# Patient Record
Sex: FEMALE | Race: WHITE | Employment: FULL TIME | URBAN - NONMETROPOLITAN AREA
[De-identification: names, ages, dates, MRNs, and addresses within clinical notes are randomized per-mention and may not be internally consistent; named-entity substitution may affect disease eponyms.]

---

## 2017-11-22 ENCOUNTER — HOSPITAL ENCOUNTER (EMERGENCY)
Age: 32
Discharge: HOME | End: 2017-11-22
Payer: COMMERCIAL

## 2017-11-22 VITALS
HEART RATE: 124 BPM | DIASTOLIC BLOOD PRESSURE: 68 MMHG | WEIGHT: 200 LBS | TEMPERATURE: 98.1 F | OXYGEN SATURATION: 95 % | RESPIRATION RATE: 18 BRPM | BODY MASS INDEX: 28.63 KG/M2 | HEIGHT: 70 IN | SYSTOLIC BLOOD PRESSURE: 171 MMHG

## 2017-11-22 DIAGNOSIS — J06.9 VIRAL URI WITH COUGH: Primary | ICD-10-CM

## 2017-11-22 DIAGNOSIS — H65.01 RIGHT ACUTE SEROUS OTITIS MEDIA, RECURRENCE NOT SPECIFIED: ICD-10-CM

## 2017-11-22 PROCEDURE — 99202 OFFICE O/P NEW SF 15 MIN: CPT | Performed by: NURSE PRACTITIONER

## 2017-11-22 PROCEDURE — 94640 AIRWAY INHALATION TREATMENT: CPT

## 2017-11-22 PROCEDURE — 99212 OFFICE O/P EST SF 10 MIN: CPT

## 2017-11-22 PROCEDURE — 6370000000 HC RX 637 (ALT 250 FOR IP): Performed by: NURSE PRACTITIONER

## 2017-11-22 RX ORDER — BROMPHENIRAMINE MALEATE, PSEUDOEPHEDRINE HYDROCHLORIDE, AND DEXTROMETHORPHAN HYDROBROMIDE 2; 30; 10 MG/5ML; MG/5ML; MG/5ML
5 SYRUP ORAL 4 TIMES DAILY PRN
Qty: 80 ML | Refills: 0 | Status: SHIPPED | OUTPATIENT
Start: 2017-11-22 | End: 2017-11-27

## 2017-11-22 RX ORDER — BROMPHENIRAMINE MALEATE, PSEUDOEPHEDRINE HYDROCHLORIDE, AND DEXTROMETHORPHAN HYDROBROMIDE 2; 30; 10 MG/5ML; MG/5ML; MG/5ML
1.25 SYRUP ORAL 4 TIMES DAILY PRN
Qty: 80 ML | Refills: 0 | Status: SHIPPED | OUTPATIENT
Start: 2017-11-22 | End: 2017-11-22

## 2017-11-22 RX ORDER — IPRATROPIUM BROMIDE AND ALBUTEROL SULFATE 2.5; .5 MG/3ML; MG/3ML
1 SOLUTION RESPIRATORY (INHALATION) ONCE
Status: COMPLETED | OUTPATIENT
Start: 2017-11-22 | End: 2017-11-22

## 2017-11-22 RX ORDER — ALBUTEROL SULFATE 90 UG/1
2 AEROSOL, METERED RESPIRATORY (INHALATION) 2 TIMES DAILY
Qty: 1 INHALER | Refills: 0 | Status: SHIPPED | OUTPATIENT
Start: 2017-11-22 | End: 2017-11-27

## 2017-11-22 RX ORDER — AMOXICILLIN 500 MG/1
500 CAPSULE ORAL 3 TIMES DAILY
Qty: 30 CAPSULE | Refills: 0 | Status: SHIPPED | OUTPATIENT
Start: 2017-11-22 | End: 2017-12-02

## 2017-11-22 RX ORDER — M-VIT,TX,IRON,MINS/CALC/FOLIC 27MG-0.4MG
1 TABLET ORAL DAILY
COMMUNITY

## 2017-11-22 RX ORDER — METHYLPREDNISOLONE 4 MG/1
TABLET ORAL
Qty: 1 KIT | Refills: 0 | Status: SHIPPED | OUTPATIENT
Start: 2017-11-22 | End: 2017-11-28

## 2017-11-22 RX ADMIN — IPRATROPIUM BROMIDE AND ALBUTEROL SULFATE 1 AMPULE: .5; 3 SOLUTION RESPIRATORY (INHALATION) at 11:32

## 2017-11-22 ASSESSMENT — ENCOUNTER SYMPTOMS
NAUSEA: 1
APNEA: 0
CONSTIPATION: 0
VOMITING: 0
SORE THROAT: 0
WHEEZING: 0
DIARRHEA: 0
CHEST TIGHTNESS: 0
SHORTNESS OF BREATH: 0
COUGH: 1
STRIDOR: 0
RHINORRHEA: 0
SINUS CONGESTION: 0
EYE DISCHARGE: 0
CHOKING: 0

## 2017-11-22 NOTE — ED NOTES
Pt discharge teaching taught via teach back method. No concerns voiced.      Aspen Shaver RN  11/22/17 9238

## 2017-11-22 NOTE — ED PROVIDER NOTES
headaches. PAST MEDICAL HISTORY   History reviewed. No pertinent past medical history. SURGICAL HISTORY     Patient  has a past surgical history that includes  section; [place] tooth extraction; and Tonsillectomy. CURRENT MEDICATIONS       Previous Medications    MULTIPLE VITAMINS-MINERALS (THERAPEUTIC MULTIVITAMIN-MINERALS) TABLET    Take 1 tablet by mouth daily       ALLERGIES     Patient is has No Known Allergies. FAMILY HISTORY     Patient's family history is not on file. SOCIAL HISTORY     Patient  reports that she has never smoked. She has never used smokeless tobacco. She reports that she drinks alcohol. She reports that she does not use drugs. PHYSICAL EXAM     ED TRIAGE VITALS  BP: (!) 144/80, Temp: 98 °F (36.7 °C), Pulse: 103, Resp: 18, SpO2: 95 %  Physical Exam   Constitutional: She appears well-developed and well-nourished. HENT:   Head: Normocephalic and atraumatic. Right Ear: A middle ear effusion is present. Left Ear: Hearing, tympanic membrane, external ear and ear canal normal.   Nose: Mucosal edema and rhinorrhea present. Mouth/Throat: Uvula is midline and mucous membranes are normal. Posterior oropharyngeal edema and posterior oropharyngeal erythema present. Cardiovascular: Normal rate, regular rhythm, S1 normal, S2 normal and normal heart sounds. Pulmonary/Chest: Effort normal. She has no decreased breath sounds. She has wheezes in the right upper field, the right middle field and the right lower field. She has no rhonchi. She has no rales. Post-aerosol DuoNeb treatment lung sounds improved minimally patient does state some mild improvement. Encouraged to  medications, start the Medrol Dosepak and use the albuterol inhaler twice a day for 5 days, with as needed use, every 6 hours, as well. Nursing note and vitals reviewed. DIAGNOSTIC RESULTS   Labs:No results found for this visit on 17.     IMAGING:  No orders to display     URGENT Congestion or Cough    METHYLPREDNISOLONE (MEDROL, GRACIELA,) 4 MG TABLET    Take by mouth.      Current Discharge Medication List          LUIZ Peterson NP  11/22/17 4355

## 2017-12-12 LAB
ABO + RH BLD: NORMAL
D AG BLD QL: NORMAL
HIV 1+2 AB+HIV1 P24 AG SERPL QL IA: NONREACTIVE
RPR SER QL: NEGATIVE

## 2018-03-06 ENCOUNTER — OFFICE VISIT (OUTPATIENT)
Dept: OBSTETRICS AND GYNECOLOGY | Facility: CLINIC | Age: 33
End: 2018-03-06
Attending: NURSE PRACTITIONER
Payer: COMMERCIAL

## 2018-03-06 VITALS — WEIGHT: 234.6 LBS | DIASTOLIC BLOOD PRESSURE: 70 MMHG | SYSTOLIC BLOOD PRESSURE: 120 MMHG

## 2018-03-06 DIAGNOSIS — Z3A.16 16 WEEKS GESTATION OF PREGNANCY: Primary | ICD-10-CM

## 2018-03-06 PROCEDURE — 0502F SUBSEQUENT PRENATAL CARE: CPT | Performed by: NURSE PRACTITIONER

## 2018-03-06 NOTE — PROGRESS NOTES
Seen in ob chart.    Routine Prenatal Visit 3/6/2018  Gestation: 15w6d    The following have been marked reviewed and updated as appropriate in this visit:       Patient blood pressure: BP: 120/70  Patient Weight: Weight: 106 kg (234 lb 9.6 oz)              Signs/symptoms of labor present: ***                Point of Care resulted today:   ,    Noted prenatal risks/complications: {Prenatal risks/complications:74180}  Problem List Items Addressed This Visit     None      Visit Diagnoses     16 weeks gestation of pregnancy    -  Primary    Relevant Orders    US OB Detail Fetal Anatomy Single        No Follow-up on file.  JESSICA Adan

## 2018-03-09 ENCOUNTER — TRANSCRIBE ORDERS (OUTPATIENT)
Dept: SCHEDULING | Age: 33
End: 2018-03-09

## 2018-03-09 DIAGNOSIS — O35.10X0 MATERNAL CARE FOR SUSPECTED CHROMOSOMAL ABNORMALITY IN FETUS, NOT APPLICABLE OR UNSPECIFIED FETUS: Primary | ICD-10-CM

## 2018-04-04 ENCOUNTER — HOSPITAL ENCOUNTER (OUTPATIENT)
Dept: PERINATAL CARE | Facility: HOSPITAL | Age: 33
Discharge: HOME | End: 2018-04-04
Attending: NURSE PRACTITIONER
Payer: COMMERCIAL

## 2018-04-04 DIAGNOSIS — O35.9XX0 SUSPECTED FETAL ANOMALY, ANTEPARTUM, SINGLE OR UNSPECIFIED FETUS: ICD-10-CM

## 2018-04-04 DIAGNOSIS — Z3A.20 20 WEEKS GESTATION OF PREGNANCY: ICD-10-CM

## 2018-04-04 DIAGNOSIS — O46.92 SECOND TRIMESTER BLEEDING: ICD-10-CM

## 2018-04-04 DIAGNOSIS — Z36.86 ENCOUNTER FOR ANTENATAL SCREENING FOR CERVICAL LENGTH: ICD-10-CM

## 2018-04-04 PROCEDURE — 76817 TRANSVAGINAL US OBSTETRIC: CPT

## 2018-04-04 PROCEDURE — 76811 OB US DETAILED SNGL FETUS: CPT

## 2018-04-06 ENCOUNTER — OFFICE VISIT (OUTPATIENT)
Dept: OBSTETRICS AND GYNECOLOGY | Facility: CLINIC | Age: 33
End: 2018-04-06
Payer: COMMERCIAL

## 2018-04-06 VITALS — WEIGHT: 238.2 LBS | SYSTOLIC BLOOD PRESSURE: 122 MMHG | DIASTOLIC BLOOD PRESSURE: 70 MMHG

## 2018-04-06 DIAGNOSIS — Z3A.20 20 WEEKS GESTATION OF PREGNANCY: Primary | ICD-10-CM

## 2018-04-06 PROCEDURE — 0502F SUBSEQUENT PRENATAL CARE: CPT | Performed by: NURSE PRACTITIONER

## 2018-05-04 ENCOUNTER — OFFICE VISIT (OUTPATIENT)
Dept: OBSTETRICS AND GYNECOLOGY | Facility: CLINIC | Age: 33
End: 2018-05-04
Payer: COMMERCIAL

## 2018-05-04 VITALS — DIASTOLIC BLOOD PRESSURE: 60 MMHG | SYSTOLIC BLOOD PRESSURE: 100 MMHG | WEIGHT: 242.6 LBS

## 2018-05-04 DIAGNOSIS — Z3A.24 24 WEEKS GESTATION OF PREGNANCY: Primary | ICD-10-CM

## 2018-05-04 LAB
GLUCOSE URINE, POC: NEGATIVE
PROTEIN, POC: NEGATIVE

## 2018-05-04 PROCEDURE — 0502F SUBSEQUENT PRENATAL CARE: CPT | Performed by: OBSTETRICS & GYNECOLOGY

## 2018-05-04 PROCEDURE — 81002 URINALYSIS NONAUTO W/O SCOPE: CPT | Performed by: OBSTETRICS & GYNECOLOGY

## 2018-05-25 ENCOUNTER — APPOINTMENT (OUTPATIENT)
Dept: LAB | Age: 33
End: 2018-05-25
Attending: OBSTETRICS & GYNECOLOGY
Payer: COMMERCIAL

## 2018-05-25 ENCOUNTER — TRANSCRIBE ORDERS (OUTPATIENT)
Dept: LAB | Age: 33
End: 2018-05-25

## 2018-05-25 DIAGNOSIS — Z3A.24 24 WEEKS GESTATION OF PREGNANCY: ICD-10-CM

## 2018-05-25 LAB
ERYTHROCYTE [DISTWIDTH] IN BLOOD BY AUTOMATED COUNT: 12.5 % (ref 11.7–14.4)
GLUCOSE 1H P GLC SERPL-MCNC: 117 MG/DL (ref 51–180)
HCT VFR BLDCO AUTO: 30.4 % (ref 35–45)
HGB BLD-MCNC: 10 G/DL (ref 11.8–15.7)
MCH RBC QN AUTO: 29.7 PG (ref 28–33.2)
MCHC RBC AUTO-ENTMCNC: 32.9 G/DL (ref 32.2–35.5)
MCV RBC AUTO: 90.2 FL (ref 83–98)
PDW BLD AUTO: 10 FL (ref 9.4–12.3)
PLATELET # BLD AUTO: 201 K/UL (ref 150–369)
RBC # BLD AUTO: 3.37 M/UL (ref 3.93–5.22)
WBC # BLD AUTO: 10.5 K/UL (ref 3.8–10.5)

## 2018-05-25 PROCEDURE — 82950 GLUCOSE TEST: CPT

## 2018-05-25 PROCEDURE — 85027 COMPLETE CBC AUTOMATED: CPT | Performed by: OBSTETRICS & GYNECOLOGY

## 2018-05-25 PROCEDURE — 36415 COLL VENOUS BLD VENIPUNCTURE: CPT | Performed by: OBSTETRICS & GYNECOLOGY

## 2018-05-31 ENCOUNTER — OFFICE VISIT (OUTPATIENT)
Dept: OBSTETRICS AND GYNECOLOGY | Facility: CLINIC | Age: 33
End: 2018-05-31
Payer: COMMERCIAL

## 2018-05-31 VITALS — DIASTOLIC BLOOD PRESSURE: 70 MMHG | SYSTOLIC BLOOD PRESSURE: 120 MMHG | WEIGHT: 254 LBS

## 2018-05-31 DIAGNOSIS — Z3A.28 28 WEEKS GESTATION OF PREGNANCY: Primary | ICD-10-CM

## 2018-05-31 LAB
GLUCOSE URINE, POC: NEGATIVE
PROTEIN, POC: NORMAL

## 2018-05-31 PROCEDURE — 81002 URINALYSIS NONAUTO W/O SCOPE: CPT | Performed by: OBSTETRICS & GYNECOLOGY

## 2018-05-31 PROCEDURE — 0502F SUBSEQUENT PRENATAL CARE: CPT | Performed by: OBSTETRICS & GYNECOLOGY

## 2018-06-19 ENCOUNTER — OFFICE VISIT (OUTPATIENT)
Dept: OBSTETRICS AND GYNECOLOGY | Facility: CLINIC | Age: 33
End: 2018-06-19
Payer: COMMERCIAL

## 2018-06-19 ENCOUNTER — TRANSCRIBE ORDERS (OUTPATIENT)
Dept: SCHEDULING | Age: 33
End: 2018-06-19

## 2018-06-19 VITALS — WEIGHT: 257.2 LBS | SYSTOLIC BLOOD PRESSURE: 110 MMHG | DIASTOLIC BLOOD PRESSURE: 62 MMHG

## 2018-06-19 DIAGNOSIS — O36.63X0 EXCESSIVE FETAL GROWTH AFFECTING MANAGEMENT OF PREGNANCY IN THIRD TRIMESTER, SINGLE OR UNSPECIFIED FETUS: ICD-10-CM

## 2018-06-19 DIAGNOSIS — O36.63X0 MATERNAL CARE FOR EXCESSIVE FETAL GROWTH IN THIRD TRIMESTER: Primary | ICD-10-CM

## 2018-06-19 DIAGNOSIS — Z3A.30 30 WEEKS GESTATION OF PREGNANCY: Primary | ICD-10-CM

## 2018-06-19 LAB
GLUCOSE URINE, POC: NEGATIVE
PROTEIN, POC: NEGATIVE

## 2018-06-19 PROCEDURE — 81002 URINALYSIS NONAUTO W/O SCOPE: CPT | Performed by: OBSTETRICS & GYNECOLOGY

## 2018-06-19 PROCEDURE — 0502F SUBSEQUENT PRENATAL CARE: CPT | Performed by: OBSTETRICS & GYNECOLOGY

## 2018-06-19 RX ORDER — OMEPRAZOLE 20 MG/1
20 TABLET, DELAYED RELEASE ORAL
Qty: 90 TABLET | Refills: 1
Start: 2018-06-19 | End: 2018-10-31

## 2018-06-25 ENCOUNTER — HOSPITAL ENCOUNTER (OUTPATIENT)
Dept: PERINATAL CARE | Facility: HOSPITAL | Age: 33
Discharge: HOME | End: 2018-06-25
Attending: OBSTETRICS & GYNECOLOGY
Payer: COMMERCIAL

## 2018-06-25 DIAGNOSIS — Z98.891 PREVIOUS CESAREAN SECTION: Primary | ICD-10-CM

## 2018-06-25 DIAGNOSIS — Z3A.31 31 WEEKS GESTATION OF PREGNANCY: ICD-10-CM

## 2018-06-25 DIAGNOSIS — O26.843 UTERINE SIZE DATE DISCREPANCY PREGNANCY, THIRD TRIMESTER: ICD-10-CM

## 2018-06-25 PROCEDURE — 76805 OB US >/= 14 WKS SNGL FETUS: CPT

## 2018-07-03 ENCOUNTER — OFFICE VISIT (OUTPATIENT)
Dept: OBSTETRICS AND GYNECOLOGY | Facility: CLINIC | Age: 33
End: 2018-07-03
Payer: COMMERCIAL

## 2018-07-03 VITALS — DIASTOLIC BLOOD PRESSURE: 76 MMHG | SYSTOLIC BLOOD PRESSURE: 138 MMHG | WEIGHT: 259 LBS

## 2018-07-03 DIAGNOSIS — Z3A.32 32 WEEKS GESTATION OF PREGNANCY: Primary | ICD-10-CM

## 2018-07-03 LAB
GLUCOSE URINE, POC: NEGATIVE
PROTEIN, POC: NORMAL

## 2018-07-17 ENCOUNTER — OFFICE VISIT (OUTPATIENT)
Dept: OBSTETRICS AND GYNECOLOGY | Facility: CLINIC | Age: 33
End: 2018-07-17
Payer: COMMERCIAL

## 2018-07-17 VITALS — WEIGHT: 265.6 LBS | SYSTOLIC BLOOD PRESSURE: 130 MMHG | DIASTOLIC BLOOD PRESSURE: 80 MMHG

## 2018-07-17 DIAGNOSIS — Z3A.35 35 WEEKS GESTATION OF PREGNANCY: Primary | ICD-10-CM

## 2018-07-17 LAB
GLUCOSE URINE, POC: NEGATIVE
PROTEIN, POC: NORMAL

## 2018-07-17 PROCEDURE — 81002 URINALYSIS NONAUTO W/O SCOPE: CPT | Performed by: OBSTETRICS & GYNECOLOGY

## 2018-07-17 PROCEDURE — 0502F SUBSEQUENT PRENATAL CARE: CPT | Performed by: OBSTETRICS & GYNECOLOGY

## 2018-07-24 ENCOUNTER — OFFICE VISIT (OUTPATIENT)
Dept: OBSTETRICS AND GYNECOLOGY | Facility: CLINIC | Age: 33
End: 2018-07-24
Payer: COMMERCIAL

## 2018-07-24 VITALS — SYSTOLIC BLOOD PRESSURE: 132 MMHG | WEIGHT: 266.8 LBS | DIASTOLIC BLOOD PRESSURE: 80 MMHG

## 2018-07-24 DIAGNOSIS — O99.013 ANEMIA AFFECTING PREGNANCY IN THIRD TRIMESTER: ICD-10-CM

## 2018-07-24 DIAGNOSIS — Z3A.35 35 WEEKS GESTATION OF PREGNANCY: Primary | ICD-10-CM

## 2018-07-24 LAB
GLUCOSE URINE, POC: NEGATIVE
PROTEIN, POC: NORMAL

## 2018-07-24 PROCEDURE — 87150 DNA/RNA AMPLIFIED PROBE: CPT | Performed by: OBSTETRICS & GYNECOLOGY

## 2018-07-24 PROCEDURE — 0502F SUBSEQUENT PRENATAL CARE: CPT | Performed by: OBSTETRICS & GYNECOLOGY

## 2018-07-24 PROCEDURE — 87081 CULTURE SCREEN ONLY: CPT | Performed by: OBSTETRICS & GYNECOLOGY

## 2018-07-24 RX ORDER — TETANUS TOXOID, REDUCED DIPHTHERIA TOXOID AND ACELLULAR PERTUSSIS VACCINE, ADSORBED 5; 2.5; 8; 8; 2.5 [IU]/.5ML; [IU]/.5ML; UG/.5ML; UG/.5ML; UG/.5ML
SUSPENSION INTRAMUSCULAR
Refills: 0 | COMMUNITY
Start: 2018-07-16 | End: 2018-08-19 | Stop reason: HOSPADM

## 2018-07-25 LAB
GP B STREP DNA SPEC QL NAA+PROBE: NEGATIVE
GP B STREP SPEC QL CULT: NORMAL

## 2018-07-30 ENCOUNTER — APPOINTMENT (OUTPATIENT)
Dept: LAB | Age: 33
End: 2018-07-30
Attending: OBSTETRICS & GYNECOLOGY
Payer: COMMERCIAL

## 2018-07-30 DIAGNOSIS — O99.013 ANEMIA AFFECTING PREGNANCY IN THIRD TRIMESTER: ICD-10-CM

## 2018-07-30 LAB
ERYTHROCYTE [DISTWIDTH] IN BLOOD BY AUTOMATED COUNT: 15.1 % (ref 11.7–14.4)
HCT VFR BLDCO AUTO: 29 % (ref 35–45)
HGB BLD-MCNC: 9 G/DL (ref 11.8–15.7)
MCH RBC QN AUTO: 26.7 PG (ref 28–33.2)
MCHC RBC AUTO-ENTMCNC: 31 G/DL (ref 32.2–35.5)
MCV RBC AUTO: 86.1 FL (ref 83–98)
PDW BLD AUTO: 10 FL (ref 9.4–12.3)
PLATELET # BLD AUTO: 182 K/UL (ref 150–369)
RBC # BLD AUTO: 3.37 M/UL (ref 3.93–5.22)
WBC # BLD AUTO: 10.7 K/UL (ref 3.8–10.5)

## 2018-07-30 PROCEDURE — 85027 COMPLETE CBC AUTOMATED: CPT

## 2018-07-30 PROCEDURE — 36415 COLL VENOUS BLD VENIPUNCTURE: CPT

## 2018-07-31 ENCOUNTER — OFFICE VISIT (OUTPATIENT)
Dept: OBSTETRICS AND GYNECOLOGY | Facility: CLINIC | Age: 33
End: 2018-07-31
Payer: COMMERCIAL

## 2018-07-31 VITALS — SYSTOLIC BLOOD PRESSURE: 120 MMHG | WEIGHT: 272.4 LBS | DIASTOLIC BLOOD PRESSURE: 60 MMHG

## 2018-07-31 DIAGNOSIS — Z3A.36 36 WEEKS GESTATION OF PREGNANCY: Primary | ICD-10-CM

## 2018-07-31 DIAGNOSIS — O99.013 ANEMIA DURING PREGNANCY IN THIRD TRIMESTER: ICD-10-CM

## 2018-07-31 LAB
GLUCOSE URINE, POC: NEGATIVE
PROTEIN, POC: NORMAL

## 2018-07-31 PROCEDURE — 0502F SUBSEQUENT PRENATAL CARE: CPT | Performed by: OBSTETRICS & GYNECOLOGY

## 2018-07-31 PROCEDURE — 81002 URINALYSIS NONAUTO W/O SCOPE: CPT | Performed by: OBSTETRICS & GYNECOLOGY

## 2018-08-03 ENCOUNTER — LAB REQUISITION (OUTPATIENT)
Dept: LAB | Facility: HOSPITAL | Age: 33
End: 2018-08-03
Attending: INTERNAL MEDICINE
Payer: COMMERCIAL

## 2018-08-03 DIAGNOSIS — D50.9 IRON DEFICIENCY ANEMIA: ICD-10-CM

## 2018-08-03 LAB
BASOPHILS # BLD: 0.02 K/UL (ref 0.01–0.1)
BASOPHILS NFR BLD: 0.2 %
DIFFERENTIAL METHOD BLD: ABNORMAL
EOSINOPHIL # BLD: 0.07 K/UL (ref 0.04–0.36)
EOSINOPHIL NFR BLD: 0.8 %
ERYTHROCYTE [DISTWIDTH] IN BLOOD BY AUTOMATED COUNT: 15.1 % (ref 11.7–14.4)
FERRITIN SERPL-MCNC: 10 NG/ML (ref 11–250)
HCT VFR BLDCO AUTO: 27.5 % (ref 35–45)
HGB BLD-MCNC: 8.8 G/DL (ref 11.8–15.7)
IMM GRANULOCYTES # BLD AUTO: 0.11 K/UL (ref 0–0.08)
IMM GRANULOCYTES NFR BLD AUTO: 1.3 %
IRON SERPL-MCNC: 27 UG/DL (ref 35–150)
LYMPHOCYTES # BLD: 1.52 K/UL (ref 1.2–3.5)
LYMPHOCYTES NFR BLD: 17.3 %
MCH RBC QN AUTO: 26.3 PG (ref 28–33.2)
MCHC RBC AUTO-ENTMCNC: 32 G/DL (ref 32.2–35.5)
MCV RBC AUTO: 82.1 FL (ref 83–98)
MONOCYTES # BLD: 0.63 K/UL (ref 0.28–0.8)
MONOCYTES NFR BLD: 7.2 %
NEUTROPHILS # BLD: 6.43 K/UL (ref 1.7–7)
NEUTS SEG NFR BLD: 73.2 %
NRBC BLD-RTO: 0 %
PDW BLD AUTO: 10.3 FL (ref 9.4–12.3)
PLATELET # BLD AUTO: 183 K/UL (ref 150–369)
RBC # BLD AUTO: 3.35 M/UL (ref 3.93–5.22)
WBC # BLD AUTO: 8.78 K/UL (ref 3.8–10.5)

## 2018-08-03 PROCEDURE — 83540 ASSAY OF IRON: CPT | Performed by: INTERNAL MEDICINE

## 2018-08-03 PROCEDURE — 82728 ASSAY OF FERRITIN: CPT | Performed by: INTERNAL MEDICINE

## 2018-08-03 PROCEDURE — 85025 COMPLETE CBC W/AUTO DIFF WBC: CPT | Performed by: INTERNAL MEDICINE

## 2018-08-07 ENCOUNTER — OFFICE VISIT (OUTPATIENT)
Dept: OBSTETRICS AND GYNECOLOGY | Facility: CLINIC | Age: 33
End: 2018-08-07
Payer: COMMERCIAL

## 2018-08-07 VITALS — SYSTOLIC BLOOD PRESSURE: 130 MMHG | DIASTOLIC BLOOD PRESSURE: 66 MMHG | WEIGHT: 274.2 LBS

## 2018-08-07 DIAGNOSIS — Z3A.37 37 WEEKS GESTATION OF PREGNANCY: Primary | ICD-10-CM

## 2018-08-07 LAB
GLUCOSE URINE, POC: NEGATIVE
PROTEIN, POC: NORMAL

## 2018-08-07 PROCEDURE — 81002 URINALYSIS NONAUTO W/O SCOPE: CPT | Performed by: OBSTETRICS & GYNECOLOGY

## 2018-08-07 PROCEDURE — 0502F SUBSEQUENT PRENATAL CARE: CPT | Performed by: OBSTETRICS & GYNECOLOGY

## 2018-08-07 RX ORDER — SODIUM CHLORIDE, SODIUM LACTATE, POTASSIUM CHLORIDE, CALCIUM CHLORIDE 600; 310; 30; 20 MG/100ML; MG/100ML; MG/100ML; MG/100ML
150 INJECTION, SOLUTION INTRAVENOUS CONTINUOUS
Status: CANCELLED | OUTPATIENT
Start: 2018-08-07 | End: 2018-08-14

## 2018-08-07 RX ORDER — ACETAMINOPHEN 650 MG/1
650 SUPPOSITORY RECTAL ONCE
Status: CANCELLED | OUTPATIENT
Start: 2018-08-07 | End: 2018-08-08

## 2018-08-07 RX ORDER — ACETAMINOPHEN 160 MG/5ML
1000 LIQUID ORAL ONCE
Status: CANCELLED | OUTPATIENT
Start: 2018-08-07 | End: 2018-08-08

## 2018-08-07 RX ORDER — ACETAMINOPHEN 325 MG/1
975 TABLET ORAL ONCE
Status: CANCELLED | OUTPATIENT
Start: 2018-08-07 | End: 2018-08-07

## 2018-08-07 RX ORDER — ONDANSETRON 4 MG/1
8 TABLET, ORALLY DISINTEGRATING ORAL ONCE
Status: CANCELLED | OUTPATIENT
Start: 2018-08-07 | End: 2018-08-07

## 2018-08-07 NOTE — H&P
HPI     Daya Pacheco is a 33 y.o. female  at 37w6d with an estimated due date of 2018, by Last Menstrual Period who presents for scheduled repeat  section  She denies ctx, VB or LOF. Baby moving well.     Pregnancy complicated by Anemia. Hgb 8.8 on 8/3/18. Getting IV iron via Hematology.     Last PO intake:   ,     ,      OB History:   Obstetric History       T1      L1     SAB4   TAB0   Ectopic0   Multiple0   Live Births1       # Outcome Date GA Lbr Miky/2nd Weight Sex Delivery Anes PTL Lv   6 Current            5 2017     SAB      4 SAB 2017     SAB      3 Term 11/15/15 39w0d  5.33 kg F CS-LTranv Spinal  IVON   2 2014 7w0d    SAB      1 2011             Medical History:   Past Medical History:   Diagnosis Date   • Anemia in pregnancy        Surgical History:   Past Surgical History:   Procedure Laterality Date   •  SECTION, LOW TRANSVERSE     • D&C FIRST TRIMESTER / TX INCOMPLETE / MISSED / SEPTIC / INDUCED      • D&C FIRST TRIMESTER / TX INCOMPLETE / MISSED / SEPTIC / INDUCED      • D&C FIRST TRIMESTER / TX INCOMPLETE / MISSED / SEPTIC / INDUCED      • TONSILLECTOMY     • WISDOM TOOTH EXTRACTION         Social History:   Social History     Social History   • Marital status:      Spouse name: Rowdy Pacheco   • Number of children: N/A   • Years of education: N/A     Occupational History   • Mental health therapist      Social History Main Topics   • Smoking status: Never Smoker   • Smokeless tobacco: Never Used   • Alcohol use No   • Drug use: No   • Sexual activity: Yes     Partners: Male     Other Topics Concern   • None     Social History Narrative   • None        Family History: History reviewed. No pertinent family history.    Allergies: No known allergies    Prior to Admission medications    Medication Sig Start Date End Date Taking? Authorizing Provider   BOOSTRIX TDAP 2.5-8-5 Lf-mcg-Lf/0.5mL  injection TO BE ADMINISTERED BY PHARMACIST FOR IMMUNIZATION 18   Katerina Bravo MD   omeprazole OTC (PriLOSEC OTC) 20 mg EC tablet Take 1 tablet (20 mg total) by mouth daily before breakfast. 18  Cammie Gutiérrez MD   PRENATAL VITS96/IRON FUM/FOLIC (PRE- VITAMIN) 27 mg iron- 800 mcg tablet Take 1 tablet by mouth daily.    Katerina Bravo MD   ranitidine (ZANTAC) 15 mg/mL syrup Take by mouth 2 (two) times a day.    Katerina Bravo MD       Review of Systems  Pertinent items are noted in HPI.    Objective     Vital Signs for the last 24 hours:  BP: (130)/(66) 130/66    Latest cervical exam:  18- closed/long    Exam:  General Appearance: Alert, cooperative, no acute distress  Lungs: Clear to auscultation bilaterally, respirations unlabored  Heart: Regular rate and rhythm, S1 and S2 normal, no murmur, rub or gallop  Abdomen: gravid, nontender  Genitalia: See vaginal exam  Extremities: no edema or calf tenderness  Neurologic: grossly intact without focal deficits    Ultrasounds:   I have reviewed the applicable Ultrasounds. and Significant findings include:   (18 at 31 wks)  EFW 2630g 1mr16ui (>90%)       Labs:  Rubella IgG Quant   Date Value Ref Range Status   2018 48.2 IU/ML Final     Comment:     REFERENCE RANGE:  <10 IU/mL: Non-Reactive. Considered to have absence of immunity.  10.0-14.9 IU/mL Equivocal. Follow up sample or testing with an alternate method suggested.  >= 15 IU/mL:    Reactive. Indicates acute or past infection or vaccination.       Strep Gp B Cult/DNA Probe   Date Value Ref Range Status   2018 No growth at 18-24 hours  Final     Group B Streptococcus, PCR   Date Value Ref Range Status   2018 Negative Negative Final     Comment:       No Group B Streptococcus detected by PCR       Assessment/Plan     Daya Pacheco is a 33 y.o. female  at 37w6d admitted for repeat . Declines TOLAC    - admit to L&D  - NPO/IVF  -  Ancef per protocol  - NST on admission  - scan for fetal position      Cammie Gutiérrez MD

## 2018-08-13 ENCOUNTER — OFFICE VISIT (OUTPATIENT)
Dept: OBSTETRICS AND GYNECOLOGY | Facility: CLINIC | Age: 33
End: 2018-08-13
Payer: COMMERCIAL

## 2018-08-13 VITALS — SYSTOLIC BLOOD PRESSURE: 138 MMHG | WEIGHT: 267.8 LBS | DIASTOLIC BLOOD PRESSURE: 90 MMHG

## 2018-08-13 DIAGNOSIS — Z3A.38 38 WEEKS GESTATION OF PREGNANCY: Primary | ICD-10-CM

## 2018-08-13 LAB
GLUCOSE URINE, POC: NEGATIVE
PROTEIN, POC: NORMAL

## 2018-08-13 PROCEDURE — 0502F SUBSEQUENT PRENATAL CARE: CPT | Performed by: OBSTETRICS & GYNECOLOGY

## 2018-08-13 PROCEDURE — 81002 URINALYSIS NONAUTO W/O SCOPE: CPT | Performed by: OBSTETRICS & GYNECOLOGY

## 2018-08-14 ENCOUNTER — ANESTHESIA EVENT (INPATIENT)
Dept: OBSTETRICS AND GYNECOLOGY | Facility: HOSPITAL | Age: 33
End: 2018-08-14
Payer: COMMERCIAL

## 2018-08-14 NOTE — ANESTHESIA PREPROCEDURE EVALUATION
Anesthesia ROS/MED HX    Anesthesia History - neg  Pulmonary - neg  Neuro/Psych - neg  Cardiovascular- neg  Hematological    anemia  GI/Hepatic- neg  Musculoskeletal- neg  Endo/Other- neg  Body Habitus: Normal    34yo F presents for repeat c/s. Pregnancy c/b anemia of pregnancy, seeing hematology for IV iron infusions    Past Medical History:   Diagnosis Date   • Anemia in pregnancy        Past Surgical History:   Procedure Laterality Date   •  SECTION, LOW TRANSVERSE     • D&C FIRST TRIMESTER / TX INCOMPLETE / MISSED / SEPTIC / INDUCED      • D&C FIRST TRIMESTER / TX INCOMPLETE / MISSED / SEPTIC / INDUCED      • D&C FIRST TRIMESTER / TX INCOMPLETE / MISSED / SEPTIC / INDUCED      • TONSILLECTOMY     • WISDOM TOOTH EXTRACTION         Social History     Social History   • Marital status:      Spouse name: Rowdy Pacheco   • Number of children: N/A   • Years of education: N/A     Occupational History   • Mental health therapist      Social History Main Topics   • Smoking status: Never Smoker   • Smokeless tobacco: Never Used   • Alcohol use No   • Drug use: No   • Sexual activity: Yes     Partners: Male     Other Topics Concern   • Not on file     Social History Narrative   • No narrative on file       Allergies   Allergen Reactions   • No Known Allergies        No current facility-administered medications for this encounter.     Current Outpatient Prescriptions:   •  BOOSTRIX TDAP 2.5-8-5 Lf-mcg-Lf/0.5mL injection, TO BE ADMINISTERED BY PHARMACIST FOR IMMUNIZATION, Disp: , Rfl: 0  •  omeprazole OTC (PriLOSEC OTC) 20 mg EC tablet, Take 1 tablet (20 mg total) by mouth daily before breakfast., Disp: 90 tablet, Rfl: 1  •  PRENATAL VITS96/IRON FUM/FOLIC (PRE- VITAMIN) 27 mg iron- 800 mcg tablet, Take 1 tablet by mouth daily., Disp: , Rfl:   •  ranitidine (ZANTAC) 15 mg/mL syrup, Take by mouth 2 (two) times a day., Disp: , Rfl:     There were no vitals filed  for this visit.      CBC Results       08/03/18 07/30/18 05/25/18                    0959 1111 0908         WBC 8.78 10.70 (H) 10.50         RBC 3.35 (L) 3.37 (L) 3.37 (L)         HGB 8.8 (L) 9.0 (L) 10.0 (L)         HCT 27.5 (L) 29.0 (L) 30.4 (L)         MCV 82.1 (L) 86.1 90.2         MCH 26.3 (L) 26.7 (L) 29.7         MCHC 32.0 (L) 31.0 (L) 32.9          182 201                     BMP Results       01/21/18 01/28/15 01/27/15                    2017 0556 1121          136 140         K 3.7 4.0 4.2         Cl 104 106 108         CO2 24 25 24         Glucose 123 (H) 98 89         BUN 10 11 16         Creatinine 0.5 (L) 0.9 0.9         Calcium 9.1 7.4 (L) 8.8 (L)         Anion Gap 8 5 8         Comment for K at 2017 on 01/21/18:  RESULTS OBTAINED ON PLASMA. PLASMA POTASSIUM VALUES MAY BE UP TO 0.4 MEQ/L LESS THAN SERUM VALUES. THE DIFFERENCES MAY BE GREATER FOR PATIENTS WITH HIGH PLATELET OR WHITE CELL COUNTS.    Comment for K at 1121 on 01/27/15:  RESULTS OBTAINED ON PLASMA. PLASMA POTASSIUM VALUES MAY BE UP TO 0.4 MEQ/L LESS THAN SERUM VALUES. THE DIFFERENCES MAY BE GREATER FOR PATIENTS WITH HIGH PLATELET OR WHITE CELL COUNTS.        PT/PTT Results     No lab values to display.        The patient does not have an active anticoagulation episode.            Relevant Problems   No relevant active problems       Physical Exam    Airway   Mallampati: II   TM distance: <3 FB   Neck ROM: full  Cardiovascular - normal   Rhythm: regular   Rate: normal  Pulmonary - normal   clear to auscultation  Other Findings   Back - neg   landmarks identified          Anesthesia Plan    Plan: spinal    Technique: spinal     Lines and Monitors: additional IV   ASA 2  Blood Products:     Use of Blood Products Discussed: Yes     Consented to blood products  Anesthetic plan and risks discussed with: patient and spouse  Postop Plan:   Patient Disposition: inpatient floor planned admission   Pain Management: IV analgesics and  spinal

## 2018-08-15 ENCOUNTER — HOSPITAL ENCOUNTER (INPATIENT)
Facility: HOSPITAL | Age: 33
LOS: 4 days | Discharge: HOME | End: 2018-08-19
Attending: OBSTETRICS & GYNECOLOGY | Admitting: OBSTETRICS & GYNECOLOGY
Payer: COMMERCIAL

## 2018-08-15 ENCOUNTER — ANESTHESIA (INPATIENT)
Dept: OBSTETRICS AND GYNECOLOGY | Facility: HOSPITAL | Age: 33
End: 2018-08-15
Payer: COMMERCIAL

## 2018-08-15 PROBLEM — O36.60X0 MACROSOMIA AFFECTING MANAGEMENT OF MOTHER: Status: ACTIVE | Noted: 2018-08-15

## 2018-08-15 LAB
ABO + RH BLD: NORMAL
BLD GP AB SCN SERPL QL: NEGATIVE
D AG BLD QL: POSITIVE
ERYTHROCYTE [DISTWIDTH] IN BLOOD BY AUTOMATED COUNT: 18.7 % (ref 11.7–14.4)
ERYTHROCYTE [DISTWIDTH] IN BLOOD BY AUTOMATED COUNT: 19 % (ref 11.7–14.4)
HCT VFR BLDCO AUTO: 33.2 % (ref 35–45)
HCT VFR BLDCO AUTO: 34.3 % (ref 35–45)
HGB BLD-MCNC: 10.2 G/DL (ref 11.8–15.7)
HGB BLD-MCNC: 11 G/DL (ref 11.8–15.7)
LABORATORY COMMENT REPORT: NORMAL
MCH RBC QN AUTO: 26.5 PG (ref 28–33.2)
MCH RBC QN AUTO: 27.6 PG (ref 28–33.2)
MCHC RBC AUTO-ENTMCNC: 30.7 G/DL (ref 32.2–35.5)
MCHC RBC AUTO-ENTMCNC: 32.1 G/DL (ref 32.2–35.5)
MCV RBC AUTO: 86 FL (ref 83–98)
MCV RBC AUTO: 86.2 FL (ref 83–98)
PDW BLD AUTO: 10.2 FL (ref 9.4–12.3)
PDW BLD AUTO: 10.4 FL (ref 9.4–12.3)
PLATELET # BLD AUTO: 170 K/UL (ref 150–369)
PLATELET # BLD AUTO: 174 K/UL (ref 150–369)
RBC # BLD AUTO: 3.85 M/UL (ref 3.93–5.22)
RBC # BLD AUTO: 3.99 M/UL (ref 3.93–5.22)
WBC # BLD AUTO: 13.67 K/UL (ref 3.8–10.5)
WBC # BLD AUTO: 9.7 K/UL (ref 3.8–10.5)

## 2018-08-15 PROCEDURE — 86920 COMPATIBILITY TEST SPIN: CPT

## 2018-08-15 PROCEDURE — 36415 COLL VENOUS BLD VENIPUNCTURE: CPT | Performed by: OBSTETRICS & GYNECOLOGY

## 2018-08-15 PROCEDURE — 71000001 HC PACU PHASE 1 INITIAL 30MIN: Performed by: OBSTETRICS & GYNECOLOGY

## 2018-08-15 PROCEDURE — 86850 RBC ANTIBODY SCREEN: CPT

## 2018-08-15 PROCEDURE — 63700000 HC SELF-ADMINISTRABLE DRUG

## 2018-08-15 PROCEDURE — 72000021 HC C SECTION LEVEL 1: Performed by: OBSTETRICS & GYNECOLOGY

## 2018-08-15 PROCEDURE — 59160 D & C AFTER DELIVERY: CPT | Performed by: OBSTETRICS & GYNECOLOGY

## 2018-08-15 PROCEDURE — 63700000 HC SELF-ADMINISTRABLE DRUG: Performed by: OBSTETRICS & GYNECOLOGY

## 2018-08-15 PROCEDURE — 25800000 HC PHARMACY IV SOLUTIONS: Performed by: NURSE ANESTHETIST, CERTIFIED REGISTERED

## 2018-08-15 PROCEDURE — 63600000 HC DRUGS/DETAIL CODE

## 2018-08-15 PROCEDURE — 63600000 HC DRUGS/DETAIL CODE: Performed by: OBSTETRICS & GYNECOLOGY

## 2018-08-15 PROCEDURE — 37000010 HC ANESTHESIA SPINAL: Performed by: OBSTETRICS & GYNECOLOGY

## 2018-08-15 PROCEDURE — 12000000 HC ROOM AND CARE MED/SURG

## 2018-08-15 PROCEDURE — 63600000 HC DRUGS/DETAIL CODE: Performed by: STUDENT IN AN ORGANIZED HEALTH CARE EDUCATION/TRAINING PROGRAM

## 2018-08-15 PROCEDURE — 25000000 HC PHARMACY GENERAL: Performed by: NURSE ANESTHETIST, CERTIFIED REGISTERED

## 2018-08-15 PROCEDURE — 86592 SYPHILIS TEST NON-TREP QUAL: CPT | Performed by: OBSTETRICS & GYNECOLOGY

## 2018-08-15 PROCEDURE — 36430 TRANSFUSION BLD/BLD COMPNT: CPT

## 2018-08-15 PROCEDURE — 85027 COMPLETE CBC AUTOMATED: CPT | Performed by: STUDENT IN AN ORGANIZED HEALTH CARE EDUCATION/TRAINING PROGRAM

## 2018-08-15 PROCEDURE — 63700000 HC SELF-ADMINISTRABLE DRUG: Performed by: STUDENT IN AN ORGANIZED HEALTH CARE EDUCATION/TRAINING PROGRAM

## 2018-08-15 PROCEDURE — 85027 COMPLETE CBC AUTOMATED: CPT | Performed by: OBSTETRICS & GYNECOLOGY

## 2018-08-15 PROCEDURE — 37000010 ANESTHESIA SPINAL BLOCK: Performed by: STUDENT IN AN ORGANIZED HEALTH CARE EDUCATION/TRAINING PROGRAM

## 2018-08-15 PROCEDURE — 63600000 HC DRUGS/DETAIL CODE: Performed by: NURSE ANESTHETIST, CERTIFIED REGISTERED

## 2018-08-15 PROCEDURE — 71000011 HC PACU PHASE 1 EA ADDL MIN: Performed by: OBSTETRICS & GYNECOLOGY

## 2018-08-15 PROCEDURE — 59510 CESAREAN DELIVERY: CPT | Performed by: OBSTETRICS & GYNECOLOGY

## 2018-08-15 RX ORDER — DIPHENHYDRAMINE HCL 25 MG
25 CAPSULE ORAL EVERY 6 HOURS PRN
Status: DISCONTINUED | OUTPATIENT
Start: 2018-08-15 | End: 2018-08-19 | Stop reason: HOSPADM

## 2018-08-15 RX ORDER — ONDANSETRON 8 MG/1
TABLET, ORALLY DISINTEGRATING ORAL
Status: DISPENSED
Start: 2018-08-15 | End: 2018-08-15

## 2018-08-15 RX ORDER — PHENYLEPHRINE HYDROCHLORIDE 10 MG/ML
INJECTION INTRAVENOUS AS NEEDED
Status: DISCONTINUED | OUTPATIENT
Start: 2018-08-15 | End: 2018-08-15 | Stop reason: SURG

## 2018-08-15 RX ORDER — HYDROMORPHONE HYDROCHLORIDE 1 MG/ML
.5-1 INJECTION, SOLUTION INTRAMUSCULAR; INTRAVENOUS; SUBCUTANEOUS
Status: DISCONTINUED | OUTPATIENT
Start: 2018-08-15 | End: 2018-08-19 | Stop reason: HOSPADM

## 2018-08-15 RX ORDER — MORPHINE SULFATE 1 MG/ML
INJECTION, SOLUTION EPIDURAL; INTRATHECAL; INTRAVENOUS AS NEEDED
Status: DISCONTINUED | OUTPATIENT
Start: 2018-08-15 | End: 2018-08-15 | Stop reason: SURG

## 2018-08-15 RX ORDER — BUPIVACAINE HYDROCHLORIDE 7.5 MG/ML
INJECTION, SOLUTION INTRASPINAL AS NEEDED
Status: DISCONTINUED | OUTPATIENT
Start: 2018-08-15 | End: 2018-08-15 | Stop reason: SURG

## 2018-08-15 RX ORDER — NALOXONE HYDROCHLORIDE 0.4 MG/ML
0.4 INJECTION, SOLUTION INTRAMUSCULAR; INTRAVENOUS; SUBCUTANEOUS AS NEEDED
Status: CANCELLED | OUTPATIENT
Start: 2018-08-15 | End: 2018-11-13

## 2018-08-15 RX ORDER — SODIUM CHLORIDE, SODIUM LACTATE, POTASSIUM CHLORIDE, CALCIUM CHLORIDE 600; 310; 30; 20 MG/100ML; MG/100ML; MG/100ML; MG/100ML
INJECTION, SOLUTION INTRAVENOUS CONTINUOUS
Status: DISCONTINUED | OUTPATIENT
Start: 2018-08-15 | End: 2018-08-19 | Stop reason: HOSPADM

## 2018-08-15 RX ORDER — POLYETHYLENE GLYCOL 3350 17 G/17G
17 POWDER, FOR SOLUTION ORAL DAILY PRN
Status: DISCONTINUED | OUTPATIENT
Start: 2018-08-15 | End: 2018-08-19 | Stop reason: HOSPADM

## 2018-08-15 RX ORDER — NALOXONE HYDROCHLORIDE 0.4 MG/ML
0.1 INJECTION, SOLUTION INTRAMUSCULAR; INTRAVENOUS; SUBCUTANEOUS
Status: CANCELLED | OUTPATIENT
Start: 2018-08-15 | End: 2018-11-13

## 2018-08-15 RX ORDER — SODIUM CHLORIDE 9 MG/ML
5 INJECTION, SOLUTION INTRAVENOUS AS NEEDED
Status: DISCONTINUED | OUTPATIENT
Start: 2018-08-15 | End: 2018-08-15 | Stop reason: HOSPADM

## 2018-08-15 RX ORDER — ENOXAPARIN SODIUM 100 MG/ML
40 INJECTION SUBCUTANEOUS
Status: DISCONTINUED | OUTPATIENT
Start: 2018-08-16 | End: 2018-08-19 | Stop reason: HOSPADM

## 2018-08-15 RX ORDER — ACETAMINOPHEN 325 MG/1
975 TABLET ORAL ONCE
Status: COMPLETED | OUTPATIENT
Start: 2018-08-15 | End: 2018-08-15

## 2018-08-15 RX ORDER — METHYLERGONOVINE MALEATE 0.2 MG/ML
INJECTION INTRAVENOUS AS NEEDED
Status: DISCONTINUED | OUTPATIENT
Start: 2018-08-15 | End: 2018-08-15 | Stop reason: SURG

## 2018-08-15 RX ORDER — AMOXICILLIN 250 MG
1 CAPSULE ORAL 2 TIMES DAILY
Status: DISCONTINUED | OUTPATIENT
Start: 2018-08-15 | End: 2018-08-19 | Stop reason: HOSPADM

## 2018-08-15 RX ORDER — ACETAMINOPHEN 325 MG/1
TABLET ORAL
Status: COMPLETED
Start: 2018-08-15 | End: 2018-08-15

## 2018-08-15 RX ORDER — DIBUCAINE 1 %
1 OINTMENT (GRAM) TOPICAL AS NEEDED
Status: DISCONTINUED | OUTPATIENT
Start: 2018-08-15 | End: 2018-08-19 | Stop reason: HOSPADM

## 2018-08-15 RX ORDER — ACETAMINOPHEN 325 MG/1
975 TABLET ORAL
Status: DISCONTINUED | OUTPATIENT
Start: 2018-08-15 | End: 2018-08-19 | Stop reason: HOSPADM

## 2018-08-15 RX ORDER — SODIUM CHLORIDE, SODIUM LACTATE, POTASSIUM CHLORIDE, CALCIUM CHLORIDE 600; 310; 30; 20 MG/100ML; MG/100ML; MG/100ML; MG/100ML
150 INJECTION, SOLUTION INTRAVENOUS CONTINUOUS
Status: DISCONTINUED | OUTPATIENT
Start: 2018-08-15 | End: 2018-08-15

## 2018-08-15 RX ORDER — FERROUS GLUCONATE 325 MG
38 TABLET ORAL
COMMUNITY
End: 2019-09-25 | Stop reason: ALTCHOICE

## 2018-08-15 RX ORDER — SODIUM CHLORIDE, SODIUM LACTATE, POTASSIUM CHLORIDE, CALCIUM CHLORIDE 600; 310; 30; 20 MG/100ML; MG/100ML; MG/100ML; MG/100ML
80 INJECTION, SOLUTION INTRAVENOUS CONTINUOUS
Status: DISCONTINUED | OUTPATIENT
Start: 2018-08-15 | End: 2018-08-19 | Stop reason: HOSPADM

## 2018-08-15 RX ORDER — METHYLERGONOVINE MALEATE 0.2 MG/1
200 TABLET ORAL EVERY 6 HOURS
Status: DISCONTINUED | OUTPATIENT
Start: 2018-08-15 | End: 2018-08-15

## 2018-08-15 RX ORDER — ACETAMINOPHEN 650 MG/20.3ML
1000 LIQUID ORAL ONCE
Status: COMPLETED | OUTPATIENT
Start: 2018-08-15 | End: 2018-08-15

## 2018-08-15 RX ORDER — DIPHENHYDRAMINE HYDROCHLORIDE 50 MG/ML
12.5 INJECTION INTRAMUSCULAR; INTRAVENOUS EVERY 6 HOURS PRN
Status: CANCELLED | OUTPATIENT
Start: 2018-08-15 | End: 2018-11-13

## 2018-08-15 RX ORDER — ONDANSETRON 4 MG/1
4 TABLET, ORALLY DISINTEGRATING ORAL EVERY 8 HOURS PRN
Status: DISCONTINUED | OUTPATIENT
Start: 2018-08-15 | End: 2018-08-19 | Stop reason: HOSPADM

## 2018-08-15 RX ORDER — ONDANSETRON HYDROCHLORIDE 2 MG/ML
4 INJECTION, SOLUTION INTRAVENOUS EVERY 8 HOURS PRN
Status: DISCONTINUED | OUTPATIENT
Start: 2018-08-15 | End: 2018-08-19 | Stop reason: HOSPADM

## 2018-08-15 RX ORDER — ONDANSETRON HYDROCHLORIDE 2 MG/ML
4 INJECTION, SOLUTION INTRAVENOUS EVERY 6 HOURS PRN
Status: CANCELLED | OUTPATIENT
Start: 2018-11-13 | End: 2019-02-11

## 2018-08-15 RX ORDER — ONDANSETRON 8 MG/1
8 TABLET, ORALLY DISINTEGRATING ORAL ONCE
Status: COMPLETED | OUTPATIENT
Start: 2018-08-15 | End: 2018-08-15

## 2018-08-15 RX ORDER — MISOPROSTOL 100 UG/1
1000 TABLET ORAL EVERY 6 HOURS
Status: DISCONTINUED | OUTPATIENT
Start: 2018-08-15 | End: 2018-08-15

## 2018-08-15 RX ORDER — CALCIUM CARBONATE 200(500)MG
500 TABLET,CHEWABLE ORAL EVERY 4 HOURS PRN
Status: DISCONTINUED | OUTPATIENT
Start: 2018-08-15 | End: 2018-08-19 | Stop reason: HOSPADM

## 2018-08-15 RX ORDER — SODIUM CHLORIDE, SODIUM GLUCONATE, SODIUM ACETATE, POTASSIUM CHLORIDE AND MAGNESIUM CHLORIDE 30; 37; 368; 526; 502 MG/100ML; MG/100ML; MG/100ML; MG/100ML; MG/100ML
INJECTION, SOLUTION INTRAVENOUS CONTINUOUS PRN
Status: DISCONTINUED | OUTPATIENT
Start: 2018-08-15 | End: 2018-08-15 | Stop reason: SURG

## 2018-08-15 RX ORDER — METHYLERGONOVINE MALEATE 0.2 MG/1
200 TABLET ORAL
Status: COMPLETED | OUTPATIENT
Start: 2018-08-15 | End: 2018-08-16

## 2018-08-15 RX ORDER — MISOPROSTOL 200 UG/1
1000 TABLET ORAL ONCE
Status: COMPLETED | OUTPATIENT
Start: 2018-08-15 | End: 2018-08-15

## 2018-08-15 RX ORDER — KETOROLAC TROMETHAMINE 30 MG/ML
30 INJECTION, SOLUTION INTRAMUSCULAR; INTRAVENOUS
Status: COMPLETED | OUTPATIENT
Start: 2018-08-15 | End: 2018-08-17

## 2018-08-15 RX ORDER — ALUMINUM HYDROXIDE, MAGNESIUM HYDROXIDE, AND SIMETHICONE 1200; 120; 1200 MG/30ML; MG/30ML; MG/30ML
30 SUSPENSION ORAL EVERY 4 HOURS PRN
Status: DISCONTINUED | OUTPATIENT
Start: 2018-08-15 | End: 2018-08-19 | Stop reason: HOSPADM

## 2018-08-15 RX ORDER — ONDANSETRON HYDROCHLORIDE 2 MG/ML
INJECTION, SOLUTION INTRAVENOUS AS NEEDED
Status: DISCONTINUED | OUTPATIENT
Start: 2018-08-15 | End: 2018-08-15 | Stop reason: SURG

## 2018-08-15 RX ORDER — OXYTOCIN/0.9 % SODIUM CHLORIDE 20/1000 ML
PLASTIC BAG, INJECTION (ML) INTRAVENOUS CONTINUOUS PRN
Status: DISCONTINUED | OUTPATIENT
Start: 2018-08-15 | End: 2018-08-15 | Stop reason: SURG

## 2018-08-15 RX ORDER — SODIUM CHLORIDE 9 MG/ML
5 INJECTION, SOLUTION INTRAVENOUS AS NEEDED
Status: DISCONTINUED | OUTPATIENT
Start: 2018-08-15 | End: 2018-08-15

## 2018-08-15 RX ORDER — ACETAMINOPHEN 650 MG/1
650 SUPPOSITORY RECTAL ONCE
Status: COMPLETED | OUTPATIENT
Start: 2018-08-15 | End: 2018-08-15

## 2018-08-15 RX ORDER — IBUPROFEN 600 MG/1
600 TABLET ORAL
Status: DISCONTINUED | OUTPATIENT
Start: 2018-08-17 | End: 2018-08-19 | Stop reason: HOSPADM

## 2018-08-15 RX ORDER — BISACODYL 10 MG/1
10 SUPPOSITORY RECTAL DAILY PRN
Status: DISCONTINUED | OUTPATIENT
Start: 2018-08-15 | End: 2018-08-19 | Stop reason: HOSPADM

## 2018-08-15 RX ORDER — LANOLIN
1 WAX (GRAM) MISCELLANEOUS AS NEEDED
Status: DISCONTINUED | OUTPATIENT
Start: 2018-08-15 | End: 2018-08-19 | Stop reason: HOSPADM

## 2018-08-15 RX ORDER — CEFAZOLIN SODIUM/WATER 1 G/10 ML
SYRINGE (ML) INTRAVENOUS
Status: COMPLETED
Start: 2018-08-15 | End: 2018-08-15

## 2018-08-15 RX ORDER — CEFAZOLIN SODIUM/WATER 1 G/10 ML
3 SYRINGE (ML) INTRAVENOUS
Status: COMPLETED | OUTPATIENT
Start: 2018-08-15 | End: 2018-08-15

## 2018-08-15 RX ORDER — PROCHLORPERAZINE EDISYLATE 5 MG/ML
10 INJECTION INTRAMUSCULAR; INTRAVENOUS EVERY 6 HOURS PRN
Status: CANCELLED | OUTPATIENT
Start: 2018-08-15 | End: 2018-11-13

## 2018-08-15 RX ORDER — DIPHENHYDRAMINE HYDROCHLORIDE 50 MG/ML
25 INJECTION INTRAMUSCULAR; INTRAVENOUS EVERY 6 HOURS PRN
Status: DISCONTINUED | OUTPATIENT
Start: 2018-08-15 | End: 2018-08-19 | Stop reason: HOSPADM

## 2018-08-15 RX ORDER — OXYCODONE HYDROCHLORIDE 5 MG/1
5-10 TABLET ORAL EVERY 4 HOURS PRN
Status: DISCONTINUED | OUTPATIENT
Start: 2018-08-15 | End: 2018-08-19 | Stop reason: HOSPADM

## 2018-08-15 RX ORDER — OXYTOCIN/0.9 % SODIUM CHLORIDE 20/1000 ML
125 PLASTIC BAG, INJECTION (ML) INTRAVENOUS CONTINUOUS
Status: DISPENSED | OUTPATIENT
Start: 2018-08-15 | End: 2018-08-15

## 2018-08-15 RX ORDER — OXYTOCIN 10 [USP'U]/ML
INJECTION, SOLUTION INTRAMUSCULAR; INTRAVENOUS AS NEEDED
Status: DISCONTINUED | OUTPATIENT
Start: 2018-08-15 | End: 2018-08-15 | Stop reason: SURG

## 2018-08-15 RX ADMIN — Medication 0.5 MG: at 12:50

## 2018-08-15 RX ADMIN — METHYLERGONOVINE MALEATE 200 MCG: 0.2 TABLET ORAL at 17:23

## 2018-08-15 RX ADMIN — ONDANSETRON 4 MG: 2 INJECTION INTRAMUSCULAR; INTRAVENOUS at 10:55

## 2018-08-15 RX ADMIN — Medication: at 10:52

## 2018-08-15 RX ADMIN — METHYLERGONOVINE MALEATE 200 MCG: 0.2 TABLET ORAL at 22:36

## 2018-08-15 RX ADMIN — SODIUM CHLORIDE, SODIUM LACTATE, POTASSIUM CHLORIDE, CALCIUM CHLORIDE: 600; 310; 30; 20 INJECTION, SOLUTION INTRAVENOUS at 09:55

## 2018-08-15 RX ADMIN — Medication 0.5 MG: at 16:47

## 2018-08-15 RX ADMIN — KETOROLAC TROMETHAMINE 30 MG: 30 INJECTION, SOLUTION INTRAMUSCULAR at 12:23

## 2018-08-15 RX ADMIN — CEFAZOLIN SODIUM 3 G: 100 INJECTION, SOLUTION INTRAVENOUS at 09:34

## 2018-08-15 RX ADMIN — ACETAMINOPHEN 975 MG: 325 TABLET ORAL at 20:52

## 2018-08-15 RX ADMIN — PRENATAL VIT W/ FE FUMARATE-FA TAB 27-0.8 MG 1 TABLET: 27-0.8 TAB at 17:24

## 2018-08-15 RX ADMIN — OXYTOCIN 20 UNITS: 10 INJECTION INTRAVENOUS at 10:36

## 2018-08-15 RX ADMIN — PHENYLEPHRINE HYDROCHLORIDE 100 MCG: 10 INJECTION INTRAVENOUS at 10:38

## 2018-08-15 RX ADMIN — PHENYLEPHRINE HYDROCHLORIDE 200 MCG: 10 INJECTION INTRAVENOUS at 10:47

## 2018-08-15 RX ADMIN — PHENYLEPHRINE HYDROCHLORIDE 200 MCG: 10 INJECTION INTRAVENOUS at 10:45

## 2018-08-15 RX ADMIN — BUPIVACAINE HYDROCHLORIDE IN DEXTROSE 1.8 ML: 7.5 INJECTION, SOLUTION SUBARACHNOID at 10:09

## 2018-08-15 RX ADMIN — PHENYLEPHRINE HYDROCHLORIDE 100 MCG: 10 INJECTION INTRAVENOUS at 10:31

## 2018-08-15 RX ADMIN — Medication 3 G: at 09:34

## 2018-08-15 RX ADMIN — ONDANSETRON 8 MG: 8 TABLET, ORALLY DISINTEGRATING ORAL at 08:57

## 2018-08-15 RX ADMIN — MORPHINE SULFATE 0.2 MG: 1 INJECTION EPIDURAL; INTRATHECAL; INTRAVENOUS at 10:09

## 2018-08-15 RX ADMIN — SODIUM CHLORIDE, SODIUM GLUCONATE, SODIUM ACETATE, POTASSIUM CHLORIDE AND MAGNESIUM CHLORIDE: 526; 502; 368; 37; 30 INJECTION, SOLUTION INTRAVENOUS at 10:15

## 2018-08-15 RX ADMIN — PHENYLEPHRINE HYDROCHLORIDE 100 MCG: 10 INJECTION INTRAVENOUS at 10:21

## 2018-08-15 RX ADMIN — PHENYLEPHRINE HYDROCHLORIDE 100 MCG: 10 INJECTION INTRAVENOUS at 10:11

## 2018-08-15 RX ADMIN — PHENYLEPHRINE HYDROCHLORIDE 100 MCG: 10 INJECTION INTRAVENOUS at 10:53

## 2018-08-15 RX ADMIN — PHENYLEPHRINE HYDROCHLORIDE 100 MCG: 10 INJECTION INTRAVENOUS at 10:56

## 2018-08-15 RX ADMIN — SENNOSIDES AND DOCUSATE SODIUM 1 TABLET: 8.6; 5 TABLET ORAL at 20:52

## 2018-08-15 RX ADMIN — ACETAMINOPHEN 975 MG: 325 TABLET ORAL at 08:58

## 2018-08-15 RX ADMIN — METHYLERGONOVINE MALEATE 0.2 MG: 0.2 INJECTION, SOLUTION INTRAMUSCULAR; INTRAVENOUS at 10:46

## 2018-08-15 RX ADMIN — PHENYLEPHRINE HYDROCHLORIDE 100 MCG: 10 INJECTION INTRAVENOUS at 10:50

## 2018-08-15 RX ADMIN — MISOPROSTOL 1000 MCG: 200 TABLET ORAL at 15:12

## 2018-08-15 RX ADMIN — ACETAMINOPHEN 975 MG: 325 TABLET ORAL at 15:09

## 2018-08-15 RX ADMIN — PHENYLEPHRINE HYDROCHLORIDE 200 MCG: 10 INJECTION INTRAVENOUS at 10:43

## 2018-08-15 RX ADMIN — PHENYLEPHRINE HYDROCHLORIDE 100 MCG: 10 INJECTION INTRAVENOUS at 10:58

## 2018-08-15 RX ADMIN — Medication 0.5 MG: at 15:53

## 2018-08-15 RX ADMIN — KETOROLAC TROMETHAMINE 30 MG: 30 INJECTION, SOLUTION INTRAMUSCULAR at 18:10

## 2018-08-15 RX ADMIN — Medication 0.5 MG: at 13:08

## 2018-08-15 RX ADMIN — PHENYLEPHRINE HYDROCHLORIDE 100 MCG: 10 INJECTION INTRAVENOUS at 10:41

## 2018-08-15 RX ADMIN — OXYCODONE HYDROCHLORIDE 5 MG: 5 TABLET ORAL at 23:33

## 2018-08-15 ASSESSMENT — PAIN SCALES - GENERAL: PAIN_LEVEL: 0

## 2018-08-15 NOTE — PLAN OF CARE
Problem: Patient Care Overview  Goal: Plan of Care Review  Outcome: Outcome(s) Achieved Date Met: 08/15/18   08/15/18 1800   Coping/Psychosocial   Plan Of Care Reviewed With patient   Plan of Care Review   Progress improving

## 2018-08-15 NOTE — PLAN OF CARE
Problem: Anesthesia/Analgesia, Neuraxial (Adult)  Goal: Signs and Symptoms of Listed Potential Problems Will be Absent, Minimized or Managed (Anesthesia/Analgesia, Neuraxial)  Outcome: Outcome(s) Achieved Date Met: 08/15/18

## 2018-08-15 NOTE — PLAN OF CARE
Problem: Patient Care Overview  Goal: Individualization & Mutuality  Outcome: Ongoing (interventions implemented as appropriate)   08/15/18 1800   Individualization   Patient Specific Preferences breast feeding   Patient Specific Goals to breastfeed   Patient Specific Interventions assist with breast feeding, pad change and fundal checks

## 2018-08-15 NOTE — PROGRESS NOTES
To bedside for heavy lochia per nursing. Performed bimanual exam and expressed 500mL dark red clot from the lower uterine segment. Lower uterine segment mildly boggy on bimanual, improved after clot removed. Lochia light at the end of the exam. Fundus firm at 1 below U.      NAD, RRR, CTAB, abd soft appropriately TTP     Vitals:    08/15/18 1400   BP: 128/73   Pulse: 76   Resp: 16   Temp:    SpO2: 95%     A/P: 34yo POD#0 RCS, PPH  1. AFVSS  2. PPH - s/p IM methergine in OR, EBL for case was 1,000mL, approximately 200mL expressed on bimanual one hour prior before being transferred to post-partum, additional 500mL expressed now. No active bleeding at this time. Will give 1000mcg of Cytotec now and continue methergine PO for 24 hours. Drawing CBC now to follow up and see if need to transfuse additional unit of blood. S/p 1uPRBC.    D/w Dr. Gutiérrez

## 2018-08-15 NOTE — NURSING NOTE
Patient had increased vaginal bleeding fundus firm at U called MD. At 1415 Patient checked by Dr. Chappell clots expressed pads and chux weighed ELB 495cc.

## 2018-08-15 NOTE — OP NOTE
OB  Delivery OP Note    Date of Procedure: 8/15/2018  Patient:Daya Pacheco  :1985    Procedure:    Repeat c section  CPT(R) Code:  83572 - NC FULL ROUT OBSTE CARE, DELIV    Review the Delivery Report for details.      Details    Pre-Op Diagnosis: 1. 33 y.o.  Intrauterine pregnancy at 39w0d  2. Scheduled repeat  section    Post-Op Diagnosis: 1. Same and delivery of viable     Procedure: Repeat  , Low Transverse  via Low Transverse uterine incision and Pfannenstiel skin incision.   Anesthesia: Spinal    Findings: Normal uterus, tubes, and ovaries.   EBL  1000 mL   Complications: None     Specimen Information:  * No specimens in log *  Drains: Perez draining clear    Staff:  Surgeon(s):  Cammie Gutiérrez MD Hartwell, Lauren J, DO  Anesthesiologist: Willard Manriquez MD  CRNA: Kae Velásquez CRNA   Circulator: Kristofer Peña RN  Scrub Person: Estrella Thompson  Baby Nurse: Erica Salcedo RN  Neonatologist: Maria Isabel Quiñonez MD  Other Staff:  MICHELLE LEYVA;KRISTOFER PEÑA;ERICA SALCEDO;MARIA ISABEL QUIÑONEZ  Delivery Assist;Delivery Nurse     INFANT INFORMATION  Time of Birth:10:35 AM   Presentation:   transverse, head to maternal right   Position:  ,  ,  ,  sacrum anterior   Cord: 3 vessels ,Complications:None   [place] Sex: female    Weight: 4.67 kg      1 Minute 5 Minute 10 Minute   Apgar Totals: 7    9            Information for the patient's :  Junior, Girl  [[id number]]     [place] Cord Gas     None          Informed Consent:  Pt presented to labor and delivery for scheduled repeat  section. An informed consent was obtained and all questions were answered.    Procedure Details:  The patient was taken to the operating room where Spinal  anesthesia was placed and found to be adequate. Antibiotics were given for infection prophylaxis. The patient was prepped and draped in the normal sterile fashion.  A timeout was  performed.  A Pfannenstiel skin incision was made with the scalpel along the site of the old scar. The incision was carried down to the fascia using the bovie. The fascia was incised and this was extended laterally with the bovie. The fascia was grasped with Kocher clamps and the underlying rectus muscle was dissected off.  The rectus muscles were  bluntly. The peritoneum was identified and entered sharply. The peritoneum was dissected to allow for adequate visualization.    The bladder blade was inserted. The vesicouterine peritoneum was identified and a bladder flap created sharply. The lower uterine segment was then incised with a Low Transverse  incision and was extended bluntly. The amniotic sac was ruptured and meconium stained fluid noted. The bladder blade was removed and the infant was found to be transverse with head high to the maternal right, spine up. The fetal sacrum was grasped and brought down to the hysterotomy. Sacrum was delivered anterior and the infant's legs were delivered without difficulty. The infant was wrapped in a wet towel and with gentle twisting motion the fetal abdomen followed by the arms were delivered in the usual fashion. The fetal head was flexed and delivered atraumatically. A spontaneous cry was heard, and the infant appeared to be moving all 4 extremities. The cord clamped and cut, and the baby was handed off to the awaiting clinicians and neonatology staff.  The placenta was removed manually. At this time the uterus was exteriorized and cleared of all clots and debris. There was felt to be retained placenta and a luis alberto curettage was performed. Another sweep of the uterus was performed and all products were felt to be removed. The hysterotomy was repaired with #1 Vicryl in a running locked fashion. A single figure of eight suture of 1 vicryl was placed in the left corner of the hysterotomy and good hemostasis observed. The ovaries and tubes appeared normal  bilaterally. The lower uterine segment appeared to be boggy and thus a dose of methergine was given IM. The uterus was then returned to the abdomen. The uterine incision was reinspected and found to be hemostatic. The serosal edges were oozy and thus Surgipowder was placed on the lower uterine segment for further hemostasis.  The gutters were inspected and cleared of all debris. The fascia was then reapproximated with a running suture of #1 Vicryl. The subcutaneous layer was reapproximated with 3-0 vicryl and the skin was closed with 4-0 Monocryl.    The patient tolerated the procedure well. The sponge, instrument, and needle counts were correct and the patient was taken to recovery in stable condition.    Lyla Chappell, DO

## 2018-08-15 NOTE — PROGRESS NOTES
Obstetrics Postpartum Progress Note    Events  Patient seen in recovery at request of nurse for VB total since arrival in recovery 1hour and 20 minutes ago      Pain: has received Toradol and now Dilaudid for incisiona pain  Bleeding: total EBL recovery 220 cc. Dr. Chappell performed pelvic exam epressing only a small clot    Voidin cc post up, Perez draining clear yellow urine  Fundus firm at U      Vitals  Temp:  [36.4 °C (97.6 °F)-36.8 °C (98.2 °F)] 36.8 °C (98.2 °F)  Heart Rate:  [67-85] 73  BP: (128-172)/(60-87) 133/63    I&O    Intake/Output Summary (Last 24 hours) at 08/15/18 1323  Last data filed at 08/15/18 1316   Gross per 24 hour   Intake             3450 ml   Output             1570 ml   Net             1880 ml     \    Labs  Labs Reviewed:  Lab Results   Component Value Date    ABO A 08/15/2018    LABRH Positive 08/15/2018          Assessment/Plan   Problem-based Assessment and Plan    Daya Pacheco is a 33 y.o.  post op day 0  s/p , Low Transverse .with EBL 1000, PPH    1. Vital Signs: stable  2. Hemodynamics: stable  3. Pain: getting more dilaudid for incisional pain  4. VTE Assessment: SCDs  5. PPH:  S/p 1 unit PRBC, total EBL now 1220, patient currently minimally bleeding and stable for discharge to Postpartum     JESSICA Bernal

## 2018-08-15 NOTE — PROGRESS NOTES
Obstetrics Postpartum Progress Note    Events  Patient seen and examined for post op check.    Denies HA/CP/SOB. Denies N/V. Pain well controlled. Pt denies dizziness or vision changes.     Subjective  Pain: controlled  Bleeding: lochia moderate - pt has had multiple gushes and dark red clots  Diet: taking regular diet  Voiding: lowery in place  Bowel: no flatus  Ambulating: not ambulating +SCDs    Vitals  Temp:  [36.4 °C (97.6 °F)-37.3 °C (99.1 °F)] 37.3 °C (99.1 °F)  Heart Rate:  [67-85] 71  Resp:  [16-18] 18  BP: (108-172)/(60-87) 142/64      Physical Exam  General: Well  Heart: Regular rate and rhythm  Lungs: Clear to auscultation bilaterally  Abdomen: soft, nondistended, appropriately TTP  Fundus: firm, at 1 below U  Incision: healing well. Clean,dry, and intact  Extremities: no edema    Labs  Labs Reviewed:  Lab Results   Component Value Date    ABO A 08/15/2018    LABRH Positive 08/15/2018       Assessment/Plan   Problem-based Assessment and Plan    Daya Pacheco is a 33 y.o.  postop day 0 s/p repeat , Low Transverse .    1. Vital Signs: stable  2. Hemodynamics: stable Hgb 11.0 from 10.2 s/p 1u PRBC  3. Pain: controlled  4. VTE Assessment: Early Ambulation, SCDs, Anticoagulation Lovenox qday  5. Vaccinations/Rhogam: rhogam not indicated   6. Post care: meeting all goals Continue routine post op care.  7. PPH - pt continues to have large clots intermittently, EBL at this time total 2000mL. Pt feels well, no s/sx acute anemia. S/p 1uPRBC. Will continue close clinical monitoring of lochia and hemodynamic status. Continue PO methergine for 24 hours. S/p cytotec 1000mcg buccal, and IM methergine x1.     Lyla Chappell, DO

## 2018-08-15 NOTE — PLAN OF CARE
Problem:  Delivery (Adult,Obstetrics,Pediatric)  Goal: Signs and Symptoms of Listed Potential Problems Will be Absent, Minimized or Managed ( Delivery)  Outcome: Outcome(s) Achieved Date Met: 08/15/18

## 2018-08-15 NOTE — H&P (VIEW-ONLY)
HPI     Daya Pacheco is a 33 y.o. female  at 37w6d with an estimated due date of 2018, by Last Menstrual Period who presents for scheduled repeat  section  She denies ctx, VB or LOF. Baby moving well.     Pregnancy complicated by Anemia. Hgb 8.8 on 8/3/18. Getting IV iron via Hematology.     Last PO intake:   ,     ,      OB History:   Obstetric History       T1      L1     SAB4   TAB0   Ectopic0   Multiple0   Live Births1       # Outcome Date GA Lbr Miky/2nd Weight Sex Delivery Anes PTL Lv   6 Current            5 2017     SAB      4 SAB 2017     SAB      3 Term 11/15/15 39w0d  5.33 kg F CS-LTranv Spinal  IVON   2 2014 7w0d    SAB      1 2011             Medical History:   Past Medical History:   Diagnosis Date   • Anemia in pregnancy        Surgical History:   Past Surgical History:   Procedure Laterality Date   •  SECTION, LOW TRANSVERSE     • D&C FIRST TRIMESTER / TX INCOMPLETE / MISSED / SEPTIC / INDUCED      • D&C FIRST TRIMESTER / TX INCOMPLETE / MISSED / SEPTIC / INDUCED      • D&C FIRST TRIMESTER / TX INCOMPLETE / MISSED / SEPTIC / INDUCED      • TONSILLECTOMY     • WISDOM TOOTH EXTRACTION         Social History:   Social History     Social History   • Marital status:      Spouse name: Rowdy Pacheco   • Number of children: N/A   • Years of education: N/A     Occupational History   • Mental health therapist      Social History Main Topics   • Smoking status: Never Smoker   • Smokeless tobacco: Never Used   • Alcohol use No   • Drug use: No   • Sexual activity: Yes     Partners: Male     Other Topics Concern   • None     Social History Narrative   • None        Family History: History reviewed. No pertinent family history.    Allergies: No known allergies    Prior to Admission medications    Medication Sig Start Date End Date Taking? Authorizing Provider   BOOSTRIX TDAP 2.5-8-5 Lf-mcg-Lf/0.5mL  injection TO BE ADMINISTERED BY PHARMACIST FOR IMMUNIZATION 18   Katerina Bravo MD   omeprazole OTC (PriLOSEC OTC) 20 mg EC tablet Take 1 tablet (20 mg total) by mouth daily before breakfast. 18  Cammie Gutiérrez MD   PRENATAL VITS96/IRON FUM/FOLIC (PRE- VITAMIN) 27 mg iron- 800 mcg tablet Take 1 tablet by mouth daily.    Katerina Bravo MD   ranitidine (ZANTAC) 15 mg/mL syrup Take by mouth 2 (two) times a day.    Katerina Bravo MD       Review of Systems  Pertinent items are noted in HPI.    Objective     Vital Signs for the last 24 hours:  BP: (130)/(66) 130/66    Latest cervical exam:  18- closed/long    Exam:  General Appearance: Alert, cooperative, no acute distress  Lungs: Clear to auscultation bilaterally, respirations unlabored  Heart: Regular rate and rhythm, S1 and S2 normal, no murmur, rub or gallop  Abdomen: gravid, nontender  Genitalia: See vaginal exam  Extremities: no edema or calf tenderness  Neurologic: grossly intact without focal deficits    Ultrasounds:   I have reviewed the applicable Ultrasounds. and Significant findings include:   (18 at 31 wks)  EFW 2630g [id number] (>90%)       Labs:  Rubella IgG Quant   Date Value Ref Range Status   2018 48.2 IU/ML Final     Comment:     REFERENCE RANGE:  <10 IU/mL: Non-Reactive. Considered to have absence of immunity.  10.0-14.9 IU/mL Equivocal. Follow up sample or testing with an alternate method suggested.  >= 15 IU/mL:    Reactive. Indicates acute or past infection or vaccination.       Strep Gp B Cult/DNA Probe   Date Value Ref Range Status   2018 No growth at 18-24 hours  Final     Group B Streptococcus, PCR   Date Value Ref Range Status   2018 Negative Negative Final     Comment:       No Group B Streptococcus detected by PCR       Assessment/Plan     Daya Pacheco is a 33 y.o. female  at 37w6d admitted for repeat . Declines TOLAC    - admit to L&D  - NPO/IVF  -  Ancef per protocol  - NST on admission  - scan for fetal position      Cammie Gutiérrez MD

## 2018-08-15 NOTE — INTERVAL H&P NOTE
H&P reviewed. The patient was examined and there are no changes to the H&P.   CBC pending, consider type and cross for 2 units if Hgb remains significantly low.   Baby scanned for transverse presentation

## 2018-08-15 NOTE — ANESTHESIA PROCEDURE NOTES
Spinal Block    Patient location during procedure: OR  Start time: 8/15/2018 10:00 AM  End time: 8/15/2018 10:07 AM  Reason for block: at surgeon's request  Preanesthetic Checklist  Completed: patient identified, surgical consent, pre-op evaluation, timeout performed, IV checked, risks and benefits discussed, monitors and equipment checked and sterile field maintained during procedure  Spinal Block  Patient position: sitting  Prep: ChloraPrep and site prepped and draped  Patient monitoring: heart rate, cardiac monitor, continuous pulse ox and blood pressure  Approach: midline  Location: L3-4  Injection technique: single-shot  Needle  Needle type: Sprotte   Needle gauge: 24 G  Needle length: 3.5 in  Assessment  Events: cerebrospinal fluid  Additional Notes  Procedure well tolerated. Vital signs stable.

## 2018-08-15 NOTE — ANESTHESIA POSTPROCEDURE EVALUATION
Patient: Daya Pacheco    Procedure Summary     Date:  08/15/18 Room / Location:  [facility] L[facility] / [facility] OR    Anesthesia Start:  0955 Anesthesia Stop:      Procedure:  Repeat c section (N/A Abdomen) Diagnosis:  (Pregnancy)    Surgeon:  Cammie Gutiérrez MD Responsible Provider:  Willard Manriquez MD    Anesthesia Type:  spinal ASA Status:  2          Anesthesia Type: spinal  PACU Vitals  8/15/2018 1123 - 8/15/2018 1223      8/15/2018 1126 8/15/2018 1127 8/15/2018 1133 8/15/2018 1136    BP: 134/83 - - -    Temp: - - 36.4 °C (97.6 °F) -    Pulse: 82 73 79 77    SpO2: - 98 % 94 % 93 %              8/15/2018 1138 8/15/2018 1141 8/15/2018 1143 8/15/2018 1146    BP: - 135/62 - -    Temp: - - 36.8 °C (98.2 °F) -    Pulse: 75 80 85 80    SpO2: 98 % 93 % 94 % 94 %              8/15/2018 1149 8/15/2018 1153 8/15/2018 1154 8/15/2018 1157    BP: - - - (!)  172/86    Temp: - - - -    Pulse: 83 80 79 80    SpO2: 94 % 94 % 96 % -              8/15/2018 1158 8/15/2018 1203 8/15/2018 1209 8/15/2018 1211    BP: 129/60 - - -    Temp: - - - -    Pulse: 77 76 76 81    SpO2: 96 % 96 % 97 % 94 %              8/15/2018 1212 8/15/2018 1214 8/15/2018 1218       BP: 128/60 - -     Temp: - - -     Pulse: 73 72 69     SpO2: - 97 % 97 %             Anesthesia Post Evaluation    Pain score: 0  Pain management: adequate  Mode of pain management: epidural  Patient location during evaluation: floor  Patient participation: complete - patient participated  Level of consciousness: awake and alert  Cardiovascular status: acceptable  Airway Patency: adequate  Respiratory status: acceptable  Hydration status: acceptable  Pain well controlled after spinal preservative free morphine administration: Yes  Continue 24 hours observation after preservative free morphine administration: Yes  Anesthetic complications: no

## 2018-08-15 NOTE — PROGRESS NOTES
Labor and Delivery Progress Note    Subjective     Interval History: pt presents to labor and delivery for repeat  section. She denies vaginal bleeding, LOF, or contractions today. She appreciates fetal movement. She states this pregnancy is uncomplicated thus far.     Objective     Fetal Monitorin/mod oscar/+accels/no decels    TOCO: quiet     Assessment/Plan     Daya Pacheco is a 33 y.o. female  at 39w0d admitted for repeat  section     FHR: reactive   GBS: negative   Repeat  section: admit to labor and delivery for repeat section, will draw admission labs, IV hydrate, Ancef to OR  Anemia: pt last Hgb was 8.8 f/u AM CBC this morning and CMx2 if remains anemic   Fetal position: transverse with head to maternal right   Obtain full set of vitals on admission     Lyla Chappell, DO

## 2018-08-15 NOTE — PLAN OF CARE
Problem: Postpartum ( Delivery) (Adult,Obstetrics,Pediatric)  Goal: Signs and Symptoms of Listed Potential Problems Will be Absent, Minimized or Managed (Postpartum)  Outcome: Ongoing (interventions implemented as appropriate)  Increased bleeding

## 2018-08-16 LAB
ERYTHROCYTE [DISTWIDTH] IN BLOOD BY AUTOMATED COUNT: 18.5 % (ref 11.7–14.4)
HCT VFR BLDCO AUTO: 27.3 % (ref 35–45)
HGB BLD-MCNC: 9 G/DL (ref 11.8–15.7)
MCH RBC QN AUTO: 28 PG (ref 28–33.2)
MCHC RBC AUTO-ENTMCNC: 33 G/DL (ref 32.2–35.5)
MCV RBC AUTO: 85 FL (ref 83–98)
PDW BLD AUTO: 10 FL (ref 9.4–12.3)
PLATELET # BLD AUTO: 162 K/UL (ref 150–369)
RBC # BLD AUTO: 3.21 M/UL (ref 3.93–5.22)
RPR SER QL: NORMAL
WBC # BLD AUTO: 9.53 K/UL (ref 3.8–10.5)

## 2018-08-16 PROCEDURE — 63700000 HC SELF-ADMINISTRABLE DRUG: Performed by: STUDENT IN AN ORGANIZED HEALTH CARE EDUCATION/TRAINING PROGRAM

## 2018-08-16 PROCEDURE — 63600000 HC DRUGS/DETAIL CODE: Performed by: STUDENT IN AN ORGANIZED HEALTH CARE EDUCATION/TRAINING PROGRAM

## 2018-08-16 PROCEDURE — 36415 COLL VENOUS BLD VENIPUNCTURE: CPT | Performed by: STUDENT IN AN ORGANIZED HEALTH CARE EDUCATION/TRAINING PROGRAM

## 2018-08-16 PROCEDURE — 12000000 HC ROOM AND CARE MED/SURG

## 2018-08-16 PROCEDURE — 85027 COMPLETE CBC AUTOMATED: CPT | Performed by: STUDENT IN AN ORGANIZED HEALTH CARE EDUCATION/TRAINING PROGRAM

## 2018-08-16 RX ADMIN — OXYCODONE HYDROCHLORIDE 10 MG: 5 TABLET ORAL at 22:36

## 2018-08-16 RX ADMIN — OXYCODONE HYDROCHLORIDE 5 MG: 5 TABLET ORAL at 14:24

## 2018-08-16 RX ADMIN — METHYLERGONOVINE MALEATE 200 MCG: 0.2 TABLET ORAL at 04:51

## 2018-08-16 RX ADMIN — KETOROLAC TROMETHAMINE 30 MG: 30 INJECTION, SOLUTION INTRAMUSCULAR at 06:20

## 2018-08-16 RX ADMIN — KETOROLAC TROMETHAMINE 30 MG: 30 INJECTION, SOLUTION INTRAMUSCULAR at 00:45

## 2018-08-16 RX ADMIN — PRENATAL VIT W/ FE FUMARATE-FA TAB 27-0.8 MG 1 TABLET: 27-0.8 TAB at 08:55

## 2018-08-16 RX ADMIN — ENOXAPARIN SODIUM 40 MG: 100 INJECTION SUBCUTANEOUS at 06:31

## 2018-08-16 RX ADMIN — SENNOSIDES AND DOCUSATE SODIUM 1 TABLET: 8.6; 5 TABLET ORAL at 20:30

## 2018-08-16 RX ADMIN — ACETAMINOPHEN 975 MG: 325 TABLET ORAL at 20:30

## 2018-08-16 RX ADMIN — KETOROLAC TROMETHAMINE 30 MG: 30 INJECTION, SOLUTION INTRAMUSCULAR at 12:21

## 2018-08-16 RX ADMIN — KETOROLAC TROMETHAMINE 30 MG: 30 INJECTION, SOLUTION INTRAMUSCULAR at 17:42

## 2018-08-16 RX ADMIN — OXYCODONE HYDROCHLORIDE 5 MG: 5 TABLET ORAL at 10:54

## 2018-08-16 RX ADMIN — METHYLERGONOVINE MALEATE 200 MCG: 0.2 TABLET ORAL at 10:54

## 2018-08-16 RX ADMIN — ACETAMINOPHEN 975 MG: 325 TABLET ORAL at 08:53

## 2018-08-16 RX ADMIN — ACETAMINOPHEN 975 MG: 325 TABLET ORAL at 14:23

## 2018-08-16 RX ADMIN — SENNOSIDES AND DOCUSATE SODIUM 1 TABLET: 8.6; 5 TABLET ORAL at 08:55

## 2018-08-16 RX ADMIN — ACETAMINOPHEN 975 MG: 325 TABLET ORAL at 03:05

## 2018-08-16 NOTE — PROGRESS NOTES
Patient: Daya Pacheco  Procedure(s):  Repeat c section  Location: [facility] L&D OR    Last vitals:   Vitals:    08/16/18 0300   BP: (!) 118/55   Pulse: 72   Resp: 18   Temp: 36.8 °C (98.2 °F)   SpO2:      Level of consciousness: Awake, Alert, Oriented  Post-anesthesia pain: Adequate analgesia  Anesthetic complications: None  Nausea and Vomiting Controled: Yes  Hydration: Adequate  Cardiovascular Function: Stable  Neuro Exam: WNL  Respiration: WNL  Pain is well controlled after spinal preservative free morphine administration and additional IV/PO meds:  Continue 24 hours observation after preservative free morphine administration:

## 2018-08-16 NOTE — PLAN OF CARE
Problem: Patient Care Overview  Goal: Individualization & Mutuality  Outcome: Ongoing (interventions implemented as appropriate)   18 131   Individualization   Patient Specific Goals Monitor bleeding   Mutuality/Individual Preferences   What Anxieties, Fears, Concerns, or Questions Do You Have About Your Care? worry about risk of PPH     Goal: Discharge Needs Assessment  Outcome: Ongoing (interventions implemented as appropriate)   18 131   DC Needs Assessment   Concerns To Be Addressed no discharge needs identified     Goal: Interprofessional Rounds/Family Conf  Outcome: Ongoing (interventions implemented as appropriate)   18   Interdisciplinary Rounds/Family Conf   Participants nursing       Problem:  Delivery (Adult,Obstetrics,Pediatric)  Goal: Signs and Symptoms of Listed Potential Problems Will be Absent, Minimized or Managed ( Delivery)  Outcome: Ongoing (interventions implemented as appropriate)   18 131    Delivery   Problems Assessed ( Delivery) all   Problems Present ( Delivery) none       Problem: Postpartum ( Delivery) (Adult,Obstetrics,Pediatric)  Goal: Signs and Symptoms of Listed Potential Problems Will be Absent, Minimized or Managed (Postpartum)  Outcome: Ongoing (interventions implemented as appropriate)   18 131   Postpartum ( Delivery)   Problems Assessed (Postpartum ) all;hemorrhage  (PPH in L& D)   Problems Present (Postpartum ) none  (bleeding within normal limits at this time)     Goal: Anesthesia/Sedation Recovery  Outcome: Ongoing (interventions implemented as appropriate)

## 2018-08-16 NOTE — STUDENT
S: Patient is a 33 year old female status post  section (PPD1) and was sitting comfortably in bed, holding her baby. Her  was awake at the bedside. She has fatigue and some soreness. She is tolerating food and liquids. She has a lowery catheter which was removed shortly after I left the room. The catheter had collected 1000mL of yellow urine over an unknown period of time. She has passed a little gas but no bowel movement. She has moderate bleeding. She states that her pain is more soreness and it is well controlled. She denies chest pain, SOB, nausea, vomiting, fever, chills, dizziness, or syncope. She has not ambulated yet, she has the leg compression sleeves on and is on Lovenox.     O: T 98.2 HR 72 RR 18 /55 O2 95  Hgb 11.0 to 9.0. WBC 13.67 to 9.53. Platelets 170 to 162    Exam not performed as patient was holding baby, hoping to feed baby soon. Bandage was not removed by me as the patient was holding the baby.    A:  A 33 year old female is status post  section PPD1 who is doing well    1.) AFVSS  2.) Urine output: 1000mL on lowery over unknown period of time. Urine was clear and yellow. Lowery was removed shortly after my visit.  3.) Post partum bleeding: patient's hemoglobin is continued to be monitored. The bleeding is moderate, but no clots have been removed since yesterday (as per resident's note yesterday).  5.) C- section DVT prophylaxis includes leg compression sleeves and Lovenox.

## 2018-08-16 NOTE — LACTATION NOTE
"Per mom infant BF well. Reviewed BF teaching and BF section of \"[employer]\" pt.  book. Questions answered. Has pump for home. Declined latch assist.   "

## 2018-08-16 NOTE — PROGRESS NOTES
Obstetrics Postpartum Progress Note    Events  Patient seen and examined. She continued to have large clotting yesterday afternoon in the post-operative period. Continued to have moderate lochia overnight but no clotting.   Denies HA/CP/SOB. Denies N/V. Pain well controlled.Denies lightheadedness, dizziness. Complains of fatigue.     Subjective  Pain: controlled  Bleeding: lochia moderate  Diet: taking regular diet  Voiding: lowery discontinued, awaiting spontaneous void  Bowel: passing flatus no bowel movement  Ambulating: as tolerated    Vitals  Temp:  [36.4 °C (97.6 °F)-37.6 °C (99.7 °F)] 36.8 °C (98.2 °F)  Heart Rate:  [67-85] 72  Resp:  [16-18] 18  BP: (108-172)/(55-87) 118/55      Physical Exam  General: Well  Heart: Regular rate and rhythm  Lungs: Clear to auscultation bilaterally  Abdomen: soft, nondistended, appropriately TTP  Fundus: firm, at umbillicus  Incision: healing well. Clean,dry, and intact  Extremities: +1 edema    Labs  Labs Reviewed:  Lab Results   Component Value Date    ABO A 08/15/2018    LABRH Positive 08/15/2018      Rubella: immune    Assessment/Plan   Problem-based Assessment and Plan    Daya Pacheco is a 33 y.o.  postop day 1 s/p , Low Transverse with PPH    1. Vital Signs: stable  2. Hemodynamics: stable Hgb 9.0 from 11.0. No signs or symptoms of acute anemia.   3. Pain: controlled  4. VTE Assessment: SCDs, lovenox qd  5. Vaccinations/Rhogam: rhogam not indicated   6. Post care: meeting all goals Continue routine post op care. For TOV today.   7. PPH: EBL  including PP clotting. S/p 1 unit PRBC, IM methergine x1, cytotec 1000, and currently on methergine PO x 24 hours. Bleeding has slowed down, hgb stable with no signs of anemia, no need for additional transfusion at this time. Continue to monitor closely.     Kerri Scott MD

## 2018-08-17 LAB
ERYTHROCYTE [DISTWIDTH] IN BLOOD BY AUTOMATED COUNT: 19 % (ref 11.7–14.4)
HCT VFR BLDCO AUTO: 27.5 % (ref 35–45)
HGB BLD-MCNC: 8.6 G/DL (ref 11.8–15.7)
MCH RBC QN AUTO: 27 PG (ref 28–33.2)
MCHC RBC AUTO-ENTMCNC: 31.3 G/DL (ref 32.2–35.5)
MCV RBC AUTO: 86.2 FL (ref 83–98)
PDW BLD AUTO: 10.4 FL (ref 9.4–12.3)
PLATELET # BLD AUTO: 186 K/UL (ref 150–369)
RBC # BLD AUTO: 3.19 M/UL (ref 3.93–5.22)
WBC # BLD AUTO: 10.23 K/UL (ref 3.8–10.5)

## 2018-08-17 PROCEDURE — 85027 COMPLETE CBC AUTOMATED: CPT | Performed by: STUDENT IN AN ORGANIZED HEALTH CARE EDUCATION/TRAINING PROGRAM

## 2018-08-17 PROCEDURE — 12000000 HC ROOM AND CARE MED/SURG

## 2018-08-17 PROCEDURE — 36415 COLL VENOUS BLD VENIPUNCTURE: CPT | Performed by: STUDENT IN AN ORGANIZED HEALTH CARE EDUCATION/TRAINING PROGRAM

## 2018-08-17 PROCEDURE — 63700000 HC SELF-ADMINISTRABLE DRUG: Performed by: STUDENT IN AN ORGANIZED HEALTH CARE EDUCATION/TRAINING PROGRAM

## 2018-08-17 PROCEDURE — 63600000 HC DRUGS/DETAIL CODE: Performed by: STUDENT IN AN ORGANIZED HEALTH CARE EDUCATION/TRAINING PROGRAM

## 2018-08-17 RX ORDER — OXYCODONE HYDROCHLORIDE 5 MG/1
5 TABLET ORAL EVERY 6 HOURS PRN
Qty: 15 TABLET | Refills: 0 | Status: SHIPPED | OUTPATIENT
Start: 2018-08-17 | End: 2018-08-19

## 2018-08-17 RX ORDER — IBUPROFEN 600 MG/1
600 TABLET ORAL
Qty: 60 TABLET | Refills: 1 | Status: SHIPPED | OUTPATIENT
Start: 2018-08-17 | End: 2018-08-19

## 2018-08-17 RX ORDER — AMOXICILLIN 250 MG
1 CAPSULE ORAL 2 TIMES DAILY
Qty: 60 TABLET | Refills: 1 | Status: SHIPPED | OUTPATIENT
Start: 2018-08-17 | End: 2018-10-31

## 2018-08-17 RX ADMIN — ACETAMINOPHEN 975 MG: 325 TABLET ORAL at 08:30

## 2018-08-17 RX ADMIN — SENNOSIDES AND DOCUSATE SODIUM 1 TABLET: 8.6; 5 TABLET ORAL at 20:35

## 2018-08-17 RX ADMIN — SENNOSIDES AND DOCUSATE SODIUM 1 TABLET: 8.6; 5 TABLET ORAL at 08:34

## 2018-08-17 RX ADMIN — KETOROLAC TROMETHAMINE 30 MG: 30 INJECTION, SOLUTION INTRAMUSCULAR at 12:21

## 2018-08-17 RX ADMIN — KETOROLAC TROMETHAMINE 30 MG: 30 INJECTION, SOLUTION INTRAMUSCULAR at 06:10

## 2018-08-17 RX ADMIN — OXYCODONE HYDROCHLORIDE 10 MG: 5 TABLET ORAL at 17:54

## 2018-08-17 RX ADMIN — KETOROLAC TROMETHAMINE 30 MG: 30 INJECTION, SOLUTION INTRAMUSCULAR at 00:08

## 2018-08-17 RX ADMIN — ACETAMINOPHEN 975 MG: 325 TABLET ORAL at 03:17

## 2018-08-17 RX ADMIN — ACETAMINOPHEN 975 MG: 325 TABLET ORAL at 20:35

## 2018-08-17 RX ADMIN — PRENATAL VIT W/ FE FUMARATE-FA TAB 27-0.8 MG 1 TABLET: 27-0.8 TAB at 08:34

## 2018-08-17 RX ADMIN — ACETAMINOPHEN 975 MG: 325 TABLET ORAL at 15:02

## 2018-08-17 RX ADMIN — OXYCODONE HYDROCHLORIDE 10 MG: 5 TABLET ORAL at 12:21

## 2018-08-17 RX ADMIN — IBUPROFEN 600 MG: 600 TABLET, FILM COATED ORAL at 23:57

## 2018-08-17 RX ADMIN — IBUPROFEN 600 MG: 600 TABLET, FILM COATED ORAL at 17:49

## 2018-08-17 RX ADMIN — ENOXAPARIN SODIUM 40 MG: 100 INJECTION SUBCUTANEOUS at 06:10

## 2018-08-17 NOTE — PROGRESS NOTES
Obstetrics Postpartum Progress Note    Events  Patient seen and examined. No acute events overnight.   Denies HA/CP/SOB. Denies N/V. Pain well controlled.    Subjective  Pain: controlled  Bleeding: lochia minimal  Diet: taking regular diet  Voiding: without difficulty  Bowel: passing flatus no bowel movement  Ambulating: as tolerated    Vitals  Temp:  [37 °C (98.6 °F)] 37 °C (98.6 °F)  Heart Rate:  [78-90] 90  Resp:  [16-18] 16  BP: (118-143)/(59-77) 118/63      Physical Exam  General: Well  Heart: Regular rate and rhythm  Lungs: Clear to auscultation bilaterally  Abdomen: soft, nondistended, appropriately TTP  Fundus: firm, at umb  Incision: healing well. Clean,dry, and intact  Extremities: +1 edema    Labs  Labs Reviewed:  Lab Results   Component Value Date    ABO A 08/15/2018    LABRH Positive 08/15/2018      Rubella: immune    Assessment/Plan   Problem-based Assessment and Plan    Daya Pacheco is a 33 y.o.  postpartum day 2 s/p , Low Transverse , PPH, gHTN    1. Vital Signs: stable  2. Hemodynamics: stable Hgb 9.0 from 11.0. No signs or symptoms of acute anemia.   3. Pain: controlled  4. VTE Assessment: Early Ambulation, SCDs, Anticoagulation lovenox qd  5. Vaccinations/Rhogam: rhogam not indicated   6. Post care: meeting all goals Continue routine post op care.  7. PPH: lochia currently minimal. Fundus firm. Total EBL 0, getting additional CBC today, pending. S/p methergine x24 hours.   8. GHTN: one mild range pressure yesterday, otherwise normotensive. Continue close monitoring.     Kerri Scott MD

## 2018-08-17 NOTE — LACTATION NOTE
Infant started on Bili Bed today. Mom has been BF infant and pumping after feeds. Mom supplementing with EBM/formula. Reviewed feeding plan. Questions answered.

## 2018-08-17 NOTE — PLAN OF CARE
Problem: Breastfeeding (Adult,Obstetrics,Pediatric)  Goal: Signs and Symptoms of Listed Potential Problems Will be Absent, Minimized or Managed (Breastfeeding)  Outcome: Ongoing (interventions implemented as appropriate)   08/17/18 1836   Breastfeeding   Problems Assessed (Breastfeeding) all   Problems Present (Breastfeeding) none

## 2018-08-17 NOTE — PLAN OF CARE
Problem: Patient Care Overview  Goal: Plan of Care Review  Outcome: Ongoing (interventions implemented as appropriate)      Problem: Postpartum ( Delivery) (Adult,Obstetrics,Pediatric)  Goal: Signs and Symptoms of Listed Potential Problems Will be Absent, Minimized or Managed (Postpartum)  Outcome: Ongoing (interventions implemented as appropriate)      Problem: Breastfeeding (Adult,Obstetrics,Pediatric)  Goal: Signs and Symptoms of Listed Potential Problems Will be Absent, Minimized or Managed (Breastfeeding)  Outcome: Ongoing (interventions implemented as appropriate)

## 2018-08-17 NOTE — PLAN OF CARE
Problem: Patient Care Overview  Goal: Plan of Care Review  Outcome: Ongoing (interventions implemented as appropriate)   08/17/18 3588   Coping/Psychosocial   Plan Of Care Reviewed With patient;spouse   Plan of Care Review   Progress improving   Outcome Summary baby and family doing well, pain well controled, breastfeeding

## 2018-08-17 NOTE — PLAN OF CARE
Problem: Postpartum ( Delivery) (Adult,Obstetrics,Pediatric)  Goal: Signs and Symptoms of Listed Potential Problems Will be Absent, Minimized or Managed (Postpartum)  Outcome: Ongoing (interventions implemented as appropriate)   18 6156   Postpartum ( Delivery)   Problems Assessed (Postpartum ) all   Problems Present (Postpartum ) none

## 2018-08-17 NOTE — DISCHARGE SUMMARY
Obstetrical Discharge Form          Date of Delivery: 8/15/2018 at 10:35 AM     Gestational Age:39w0d    Delivered By: Cammie Gutiérrez     Delivery Type: repeat  section, low transverse incision    Antepartum complications: anemia    Baby: Liveborn female , Apgars 7   /9   , 4.67 kg     Anesthesia: Spinal     Intrapartum complications: Postpartum Hemorrhage    Laceration:       Feeding method: breast    Rh Immune globulin given: no    Discharge Date: 2018      Plan:    Follow-up appointment with your doctors in 2 and 6 weeks, please call for an appointment

## 2018-08-17 NOTE — PLAN OF CARE
Problem: Patient Care Overview  Goal: Plan of Care Review  Outcome: Ongoing (interventions implemented as appropriate)   18   Coping/Psychosocial   Plan Of Care Reviewed With patient   Plan of Care Review   Progress progress toward functional goals as expected     Goal: Discharge Needs Assessment  Outcome: Ongoing (interventions implemented as appropriate)   18   DC Needs Assessment   Concerns To Be Addressed no discharge needs identified       Problem:  Delivery (Adult,Obstetrics,Pediatric)  Goal: Signs and Symptoms of Listed Potential Problems Will be Absent, Minimized or Managed ( Delivery)  Outcome: Ongoing (interventions implemented as appropriate)   18    Delivery   Problems Assessed ( Delivery) all   Problems Present ( Delivery) none       Problem: Postpartum ( Delivery) (Adult,Obstetrics,Pediatric)  Goal: Signs and Symptoms of Listed Potential Problems Will be Absent, Minimized or Managed (Postpartum)  Outcome: Ongoing (interventions implemented as appropriate)   18   Postpartum ( Delivery)   Problems Assessed (Postpartum ) all   Problems Present (Postpartum ) none

## 2018-08-18 ENCOUNTER — APPOINTMENT (INPATIENT)
Dept: RADIOLOGY | Facility: HOSPITAL | Age: 33
End: 2018-08-18
Attending: STUDENT IN AN ORGANIZED HEALTH CARE EDUCATION/TRAINING PROGRAM
Payer: COMMERCIAL

## 2018-08-18 LAB
CROSSMATCH: NORMAL
CROSSMATCH: NORMAL
ERYTHROCYTE [DISTWIDTH] IN BLOOD BY AUTOMATED COUNT: 19.5 % (ref 11.7–14.4)
HCT VFR BLDCO AUTO: 24 % (ref 35–45)
HGB BLD-MCNC: 7.5 G/DL (ref 11.8–15.7)
ISBT CODE: 6200
ISBT CODE: 6200
MCH RBC QN AUTO: 27.7 PG (ref 28–33.2)
MCHC RBC AUTO-ENTMCNC: 31.3 G/DL (ref 32.2–35.5)
MCV RBC AUTO: 88.6 FL (ref 83–98)
PDW BLD AUTO: 10 FL (ref 9.4–12.3)
PLATELET # BLD AUTO: 165 K/UL (ref 150–369)
PRODUCT CODE: NORMAL
PRODUCT CODE: NORMAL
PRODUCT STATUS: NORMAL
PRODUCT STATUS: NORMAL
RBC # BLD AUTO: 2.71 M/UL (ref 3.93–5.22)
SPECIMEN EXP DATE BLD: NORMAL
SPECIMEN EXP DATE BLD: NORMAL
UNIT ABO: NORMAL
UNIT ABO: NORMAL
UNIT ID: NORMAL
UNIT ID: NORMAL
UNIT RH: POSITIVE
UNIT RH: POSITIVE
WBC # BLD AUTO: 6.31 K/UL (ref 3.8–10.5)

## 2018-08-18 PROCEDURE — 36415 COLL VENOUS BLD VENIPUNCTURE: CPT | Performed by: STUDENT IN AN ORGANIZED HEALTH CARE EDUCATION/TRAINING PROGRAM

## 2018-08-18 PROCEDURE — 63700000 HC SELF-ADMINISTRABLE DRUG: Performed by: STUDENT IN AN ORGANIZED HEALTH CARE EDUCATION/TRAINING PROGRAM

## 2018-08-18 PROCEDURE — 85027 COMPLETE CBC AUTOMATED: CPT | Performed by: STUDENT IN AN ORGANIZED HEALTH CARE EDUCATION/TRAINING PROGRAM

## 2018-08-18 PROCEDURE — 76856 US EXAM PELVIC COMPLETE: CPT

## 2018-08-18 PROCEDURE — 63600000 HC DRUGS/DETAIL CODE: Performed by: STUDENT IN AN ORGANIZED HEALTH CARE EDUCATION/TRAINING PROGRAM

## 2018-08-18 PROCEDURE — 12000000 HC ROOM AND CARE MED/SURG

## 2018-08-18 RX ORDER — FERROUS GLUCONATE 325 MG
324 TABLET ORAL
Status: DISCONTINUED | OUTPATIENT
Start: 2018-08-18 | End: 2018-08-19 | Stop reason: HOSPADM

## 2018-08-18 RX ADMIN — OXYCODONE HYDROCHLORIDE 5 MG: 5 TABLET ORAL at 21:01

## 2018-08-18 RX ADMIN — ACETAMINOPHEN 975 MG: 325 TABLET ORAL at 15:13

## 2018-08-18 RX ADMIN — PRENATAL VIT W/ FE FUMARATE-FA TAB 27-0.8 MG 1 TABLET: 27-0.8 TAB at 09:03

## 2018-08-18 RX ADMIN — FERROUS GLUCONATE 324 MG: 324 TABLET ORAL at 19:53

## 2018-08-18 RX ADMIN — OXYCODONE HYDROCHLORIDE 10 MG: 5 TABLET ORAL at 02:41

## 2018-08-18 RX ADMIN — IBUPROFEN 600 MG: 600 TABLET, FILM COATED ORAL at 06:13

## 2018-08-18 RX ADMIN — ACETAMINOPHEN 975 MG: 325 TABLET ORAL at 02:41

## 2018-08-18 RX ADMIN — ENOXAPARIN SODIUM 40 MG: 100 INJECTION SUBCUTANEOUS at 06:13

## 2018-08-18 RX ADMIN — IBUPROFEN 600 MG: 600 TABLET, FILM COATED ORAL at 18:38

## 2018-08-18 RX ADMIN — SENNOSIDES AND DOCUSATE SODIUM 1 TABLET: 8.6; 5 TABLET ORAL at 09:03

## 2018-08-18 RX ADMIN — ACETAMINOPHEN 975 MG: 325 TABLET ORAL at 21:00

## 2018-08-18 RX ADMIN — ACETAMINOPHEN 975 MG: 325 TABLET ORAL at 09:03

## 2018-08-18 RX ADMIN — OXYCODONE HYDROCHLORIDE 5 MG: 5 TABLET ORAL at 15:14

## 2018-08-18 RX ADMIN — OXYCODONE HYDROCHLORIDE 5 MG: 5 TABLET ORAL at 09:04

## 2018-08-18 RX ADMIN — SENNOSIDES AND DOCUSATE SODIUM 1 TABLET: 8.6; 5 TABLET ORAL at 19:54

## 2018-08-18 RX ADMIN — IBUPROFEN 600 MG: 600 TABLET, FILM COATED ORAL at 12:40

## 2018-08-18 NOTE — LACTATION NOTE
Per mom infant BF well but sleepy this AM. Assisted with waking and latching infant with good results. Mom pumping after feeds and supplementing with 15 cc EBM. Reviewed feeding plan. Aware to monitor out-pt. Questions answered.

## 2018-08-18 NOTE — NURSING NOTE
Pt voiced concern to RN about lack of vaginal bleeding and drop in HGb and describe history of last pregnancy of no bleeding then came back to ED for retained placenta. Concerns voiced to Dr Scott and Dr Nick. Ultrasound peding.

## 2018-08-18 NOTE — PROGRESS NOTES
Obstetrics Postpartum Progress Note    Events  Patient seen and examined. No acute events overnight.   Denies HA/CP/SOB. Denies N/V. Pain well controlled.    Subjective  Pain: controlled  Bleeding: lochia minimal  Diet: taking regular diet  Voiding: without difficulty  Bowel: passing flatus no bowel movement  Ambulating: as tolerated    Vitals  Temp:  [36.3 °C (97.3 °F)-36.7 °C (98.1 °F)] 36.7 °C (98 °F)  Heart Rate:  [66-88] 66  Resp:  [16-18] 16  BP: (109-134)/(59-71) 114/71      Physical Exam  General: Well  Heart: Regular rate and rhythm  Lungs: Clear to auscultation bilaterally  Abdomen: soft, nondistended, appropriately TTP  Fundus: firm, below U  Incision: healing well. Clean,dry, and intact  Extremities: +1 edema    Labs  Labs Reviewed:  Lab Results   Component Value Date    ABO A 08/15/2018    LABRH Positive 08/15/2018      Rubella: immune    Assessment/Plan   Problem-based Assessment and Plan    Daya Pacheco is a 33 y.o.  postop day 3 s/p , Low Transverse , PPH    1. Vital Signs: stable  2. Hemodynamics:  Hgb 11.0-->9.0-->8.6-->7.5. No signs or symptoms of acute anemia. Lochia minimal. Pt and  concerned about lack of bleeding with hgb drop, last pregnancy similar history leading to PPH at day 10 with retained placenta. Will order US today.   3. Pain: controlled  4. VTE Assessment: Early Ambulation, Anticoagulation lovenox qday  5. Vaccinations/Rhogam: rhogam not indicated   6. Post care: meeting all goals Continue routine post op care.    Kerri Scott MD    Pt doing well. Vitals stable. Did discuss drop in hgb since transfusion and will get pelvic u/s to rule out retained poc. Consider discharge if results wnl

## 2018-08-18 NOTE — PLAN OF CARE
Problem:  Delivery (Adult,Obstetrics,Pediatric)  Goal: Signs and Symptoms of Listed Potential Problems Will be Absent, Minimized or Managed ( Delivery)  Outcome: Ongoing (interventions implemented as appropriate)      Problem: Postpartum ( Delivery) (Adult,Obstetrics,Pediatric)  Goal: Signs and Symptoms of Listed Potential Problems Will be Absent, Minimized or Managed (Postpartum)  Outcome: Ongoing (interventions implemented as appropriate)      Problem: Breastfeeding (Adult,Obstetrics,Pediatric)  Goal: Signs and Symptoms of Listed Potential Problems Will be Absent, Minimized or Managed (Breastfeeding)  Outcome: Ongoing (interventions implemented as appropriate)

## 2018-08-19 VITALS
RESPIRATION RATE: 16 BRPM | HEART RATE: 70 BPM | TEMPERATURE: 98 F | OXYGEN SATURATION: 95 % | HEIGHT: 70 IN | SYSTOLIC BLOOD PRESSURE: 135 MMHG | WEIGHT: 267 LBS | DIASTOLIC BLOOD PRESSURE: 63 MMHG | BODY MASS INDEX: 38.22 KG/M2

## 2018-08-19 LAB
CROSSMATCH: NORMAL
CROSSMATCH: NORMAL
ISBT CODE: 6200
ISBT CODE: 6200
PRODUCT CODE: NORMAL
PRODUCT CODE: NORMAL
PRODUCT STATUS: NORMAL
PRODUCT STATUS: NORMAL
SPECIMEN EXP DATE BLD: NORMAL
SPECIMEN EXP DATE BLD: NORMAL
UNIT ABO: NORMAL
UNIT ABO: NORMAL
UNIT ID: NORMAL
UNIT ID: NORMAL
UNIT RH: POSITIVE
UNIT RH: POSITIVE

## 2018-08-19 PROCEDURE — 63700000 HC SELF-ADMINISTRABLE DRUG: Performed by: STUDENT IN AN ORGANIZED HEALTH CARE EDUCATION/TRAINING PROGRAM

## 2018-08-19 PROCEDURE — 63600000 HC DRUGS/DETAIL CODE: Performed by: STUDENT IN AN ORGANIZED HEALTH CARE EDUCATION/TRAINING PROGRAM

## 2018-08-19 RX ORDER — OXYCODONE HYDROCHLORIDE 5 MG/1
5 TABLET ORAL EVERY 8 HOURS PRN
Qty: 20 TABLET | Refills: 0 | Status: SHIPPED | OUTPATIENT
Start: 2018-08-19 | End: 2019-09-25 | Stop reason: ALTCHOICE

## 2018-08-19 RX ORDER — IBUPROFEN 600 MG/1
600 TABLET ORAL EVERY 8 HOURS PRN
Qty: 50 TABLET | Refills: 0 | Status: ON HOLD | OUTPATIENT
Start: 2018-08-19 | End: 2018-11-02 | Stop reason: ALTCHOICE

## 2018-08-19 RX ADMIN — ACETAMINOPHEN 975 MG: 325 TABLET ORAL at 09:42

## 2018-08-19 RX ADMIN — IBUPROFEN 600 MG: 600 TABLET, FILM COATED ORAL at 06:20

## 2018-08-19 RX ADMIN — OXYCODONE HYDROCHLORIDE 5 MG: 5 TABLET ORAL at 09:42

## 2018-08-19 RX ADMIN — IBUPROFEN 600 MG: 600 TABLET, FILM COATED ORAL at 00:18

## 2018-08-19 RX ADMIN — PRENATAL VIT W/ FE FUMARATE-FA TAB 27-0.8 MG 1 TABLET: 27-0.8 TAB at 09:42

## 2018-08-19 RX ADMIN — SENNOSIDES AND DOCUSATE SODIUM 1 TABLET: 8.6; 5 TABLET ORAL at 09:42

## 2018-08-19 RX ADMIN — OXYCODONE HYDROCHLORIDE 5 MG: 5 TABLET ORAL at 02:59

## 2018-08-19 RX ADMIN — ENOXAPARIN SODIUM 40 MG: 100 INJECTION SUBCUTANEOUS at 06:20

## 2018-08-19 RX ADMIN — ACETAMINOPHEN 975 MG: 325 TABLET ORAL at 02:59

## 2018-08-19 NOTE — PROGRESS NOTES
Obstetrics Postpartum Progress Note    Events  Patient seen and examined. No acute events overnight.   Denies chest pain, shortness of breath, dizziness, lightheadedness, palpitations.     Subjective  Pain: controlled  Bleeding: lochia none  Diet: taking regular diet  Voiding: without difficulty  Bowel: passing flatus no bowel movement  Ambulating: as tolerated    Vitals  Temp:  [36.4 °C (97.6 °F)-36.7 °C (98 °F)] 36.7 °C (98 °F)  Heart Rate:  [70-79] 70  Resp:  [16-18] 16  BP: (126-135)/(63-73) 135/63      Physical Exam  General: Well  Heart: Regular rate and rhythm  Lungs: Clear to auscultation bilaterally  Abdomen: soft, nondistended, appropriately TTP  Fundus: firm, below U  Incision: healing well. Clean,dry, and intact  Extremities: +1 edema    Labs  Labs Reviewed:  No results found for: ABO, LABRH   Rubella: immune    Assessment/Plan   Problem-based Assessment and Plan    Daya Pacheco is a 33 y.o.  postop day 4 s/p , Low Transverse , PPH    1. Vital Signs: stable  2. Hemodynamics:  Hgb 11.0-->9.0-->8.6-->7.5. No signs or symptoms of acute anemia. Lochia minimal. Pt and  concerned about lack of bleeding with hgb drop, last pregnancy similar history leading to PPH at day 10 with retained placenta. Ultrasound without evidence of retained placenta. Iron PO BID  3. Pain: controlled  4. VTE Assessment: Early Ambulation, Anticoagulation lovenox qday  5. Vaccinations/Rhogam: rhogam not indicated   6. Post care: meeting all goals Continue routine post op care.  7. Gestational HTN: normotensive. No signs/ symptoms preeclampsia.     Llio Mercado MD     Pt seen and evlauated . Plan for discharge today- no concerns for retained poc - precautions discussed

## 2018-08-21 ENCOUNTER — TELEPHONE (OUTPATIENT)
Dept: OBSTETRICS AND GYNECOLOGY | Facility: CLINIC | Age: 33
End: 2018-08-21

## 2018-08-21 NOTE — TELEPHONE ENCOUNTER
Spoke w pt. Who dabbed and saw some blood. Pt has no S&S of infection, no pain, fever etc. Plan: if worsens, to be seen.

## 2018-08-22 ENCOUNTER — OFFICE VISIT (OUTPATIENT)
Dept: OBSTETRICS AND GYNECOLOGY | Facility: CLINIC | Age: 33
End: 2018-08-22
Payer: COMMERCIAL

## 2018-08-22 VITALS — DIASTOLIC BLOOD PRESSURE: 80 MMHG | BODY MASS INDEX: 36.19 KG/M2 | WEIGHT: 252.2 LBS | SYSTOLIC BLOOD PRESSURE: 120 MMHG

## 2018-08-22 PROCEDURE — 0503F POSTPARTUM CARE VISIT: CPT | Performed by: OBSTETRICS & GYNECOLOGY

## 2018-08-22 NOTE — PROGRESS NOTES
Patient ID: Daya Pacheco   : 1985  MRN: [id number]   Visit Date: 2018    Subjective   Daya Pacheco is presenting today for Postpartum Follow-up  S/P RCS on 8/15- had a PPH and required 1 unit of PRBC and uterotonics.    Feels like she is bleeding from catheter site.  Noting some bleeding from incision last night and this morning.    No lightheadedness/ dizziness/ CP/ SOB/ palpitations.  No fevers/ chills  Urinating without difficutly  + BM- daily  Tolerating PO  Ambulating without difficulty  Pain controlled with motrin and tylenol    Lochia like a period  Notes heavier bleeding with breast feeding     Vital Signs for this encounter: /80   Wt 114 kg (252 lb 3.2 oz)   LMP 11/15/2017   BMI 36.19 kg/m²     Obstetric History:   OB History    Para Term  AB Living   6 2 2 0 4 2   SAB TAB Ectopic Multiple Live Births   4 0 0 0 2      # Outcome Date GA Lbr Miky/2nd Weight Sex Delivery Anes PTL Lv   6 Term 08/15/18 39w0d  4.67 kg F CS-LTranv Spinal N IVON   5 SAB 2017     SAB         Birth Comments: chemical pregnancy   4 SAB 2017     SAB         Birth Comments: D&E for incomplete AB   3 Term 11/15/15 39w0d  5.33 kg F CS-LTranv Spinal  IVON      Birth Comments: Postpartum hemorrhage, then retained placenta requiring D&C 2 wks post-op   2 2014 7w0d    SAB         Birth Comments: D&E for missed AB   1 2011     SAB         Birth Comments: D&E for blighted ovum        Past Medical History:  has a past medical history of Anemia in pregnancy.  Past Surgical History:  has a past surgical history that includes [place] tooth extraction; Tonsillectomy; D&C first trimester / Tx incomplete / missed / septic / induced  (); D&C first trimester / Tx incomplete / missed / septic / induced  (); D&C first trimester / Tx incomplete / missed / septic / induced  ();  section, low transverse (); and Tonsilectomy, adenoidectomy, bilateral myringotomy and  tubes.  Family History: family history is not on file.  Social History:  reports that she has never smoked. She has never used smokeless tobacco. She reports that she does not drink alcohol or use drugs.  Medications:   Current Outpatient Prescriptions:   •  ferrous gluconate (FERGON) 324 mg (38 mg iron) tablet, Take 38 mg by mouth daily with breakfast., Disp: , Rfl:   •  ibuprofen (MOTRIN) 600 mg tablet, Take 1 tablet (600 mg total) by mouth every 8 (eight) hours as needed for mild pain for up to 360 doses., Disp: 50 tablet, Rfl: 0  •  oxyCODONE (ROXICODONE) 5 mg immediate release tablet, Take 1 tablet (5 mg total) by mouth every 8 (eight) hours as needed for severe pain for up to 14 days., Disp: 20 tablet, Rfl: 0  •  PRENATAL VITS96/IRON FUM/FOLIC (PRE-ZOË VITAMIN) 27 mg iron- 800 mcg tablet, Take 1 tablet by mouth daily., Disp: , Rfl:   •  sennosides-docusate sodium (SENOKOT-S) 8.6-50 mg, Take 1 tablet by mouth 2 (two) times a day for 176 doses., Disp: 60 tablet, Rfl: 1  •  omeprazole OTC (PriLOSEC OTC) 20 mg EC tablet, Take 1 tablet (20 mg total) by mouth daily before breakfast. (Patient not taking: Reported on 2018 ), Disp: 90 tablet, Rfl: 1    Allergies: is allergic to no known allergies.     HPI  Review of Systems  Physical Exam   Constitutional: She appears well-developed and well-nourished.   Genitourinary:   External female genitalia normal.   Urethral meatus normal.   Urethra normal.   Cardiovascular: Normal rate, regular rhythm and normal heart sounds.    Pulmonary/Chest: Effort normal and breath sounds normal.   Abdominal: Soft. She exhibits no distension and no mass. There is no tenderness. There is no guarding.   Incision with heavy dark red drainage.  Incision opened 2cm around area of bleeding.  Probing with sterile Q-tip demonstrated intact fascia.  Hematoma expressed and packed.  No erythema, purulent drainage.     Skin: No erythema.   Psychiatric: She has a normal mood and affect. Her  behavior is normal.     Problem List Items Addressed This Visit     None      Visit Diagnoses     Wound hematoma following  section, postpartum    -  Primary      Hematoma opened and drained, packed.  No signs/ symptoms of infection at this time.  Will represent tomorrow for packing removal and replacement.  Counseled extensively on signs and symptoms of infection.     Meeting other postoperative milestones.      Urethra appears normal with so bleeding.      Lashonda Gates MD

## 2018-08-23 ENCOUNTER — OFFICE VISIT (OUTPATIENT)
Dept: OBSTETRICS AND GYNECOLOGY | Facility: CLINIC | Age: 33
End: 2018-08-23
Payer: COMMERCIAL

## 2018-08-23 PROCEDURE — 0503F POSTPARTUM CARE VISIT: CPT | Performed by: OBSTETRICS & GYNECOLOGY

## 2018-08-23 NOTE — PROGRESS NOTES
[facility] 8/15/18  Seen yesterday by Dr. Gates with draining wound hematoma. 2cm defect in left-center was packed.    No F/C  Overall pain control good  Dark bloody/watery drainage from dressing in last 24 hours    Dressing & packing removed, inc probed - fascia intact. There is some induration superior to incision, but pt states this was present the last few weeks of her pregnancy. A small area of developing ecchymoses superior to opening in incision. There is no erythema. Dark blood expressed from incision.    I replaced packing & dressing and instructed . They want to come back tomorrow for visit, then he's comfortable to do changes over the weekend.

## 2018-08-24 ENCOUNTER — OFFICE VISIT (OUTPATIENT)
Dept: OBSTETRICS AND GYNECOLOGY | Facility: CLINIC | Age: 33
End: 2018-08-24
Payer: COMMERCIAL

## 2018-08-24 PROCEDURE — 0503F POSTPARTUM CARE VISIT: CPT | Performed by: OBSTETRICS & GYNECOLOGY

## 2018-08-24 NOTE — PROGRESS NOTES
No new events since I saw pt yesterday.    Dressing/packing removed and replaced.    Inst. Reviewed with  who will do daily packing/dressing changes over the weekend.    No signs of wound infection.  Dark, bloody drainage from defect in left midline. Some swelling also palpated on left aspect of incision. No opening.    Call PRN over the weekend; F/U with us early next week for eval.

## 2018-08-27 ENCOUNTER — OFFICE VISIT (OUTPATIENT)
Dept: OBSTETRICS AND GYNECOLOGY | Facility: CLINIC | Age: 33
End: 2018-08-27
Payer: COMMERCIAL

## 2018-08-27 VITALS — SYSTOLIC BLOOD PRESSURE: 120 MMHG | DIASTOLIC BLOOD PRESSURE: 90 MMHG

## 2018-08-27 PROCEDURE — 0503F POSTPARTUM CARE VISIT: CPT | Performed by: OBSTETRICS & GYNECOLOGY

## 2018-08-27 RX ORDER — CEPHALEXIN 500 MG/1
500 CAPSULE ORAL 4 TIMES DAILY
Qty: 28 CAPSULE | Refills: 0 | Status: SHIPPED | OUTPATIENT
Start: 2018-08-27 | End: 2019-09-25 | Stop reason: ALTCHOICE

## 2018-08-27 NOTE — PROGRESS NOTES
8/27/2018  Daya Pacheco is a 33 y.o. female who presents for wound check    S/p Repeat LTCS on 8/15/18 by   Surgery c/b PPH with 1 u pRBC transfused    Wound opened last week. See by Dr. Gates and Ghada. Initially 2cm defect. Packing wound daily    Feels ok. Pain controlled with motrin, no narcotics. Induration seems to be softening. No active bleeding. Dark brown drainage from opening, saturating gauze 1-2 times per day.  removing/replacing packing daily.     No fever or chills. No skin erythema    Lochia normal  Breastfeeding going well, supplementing with one bottle per day  Normal bladder and bowel function  Mood stable          Review of Systems and Physical Exam  The following have been reviewed and updated as appropriate in this visit:      /90   LMP 11/15/2017   HPI  Review of Systems  Physical Exam   Constitutional: She is oriented to person, place, and time. She appears well-developed and well-nourished. No distress.   Pulmonary/Chest: Effort normal.   Abdominal: Soft. She exhibits no distension and no mass. There is no tenderness. There is no rebound and no guarding. No hernia.       Defect to left of midline 1.5cm wide by 1.5cm deep. Pink healthy granulation tissue. Slight odor from incision. Brown discharge. Packing removed/replaced    Left aspect of incision without about 1cm of surrounding induration, not fluctuant. (gray hashmarks in graphic)    Leftmost aspect of incision with superficial skin opening, no drainage   Musculoskeletal: She exhibits no edema.   Neurological: She is alert and oriented to person, place, and time.   Skin: Skin is warm and dry.   Psychiatric: She has a normal mood and affect. Her behavior is normal.       34 yo post-op wound hematoma  - appears to be healing well by secondary intention. No evidence of active cellulitis although new onset odor at incision is concerning for developing infection. Will start Keflex 500mg QID x 7 days.   - continue BID packing  change  - follow up 1 week or sooner prn  Return in about 1 week (around 9/3/2018) for Wound Check.  Cammie Gutiérrez MD

## 2018-08-30 ENCOUNTER — OFFICE VISIT (OUTPATIENT)
Dept: OBSTETRICS AND GYNECOLOGY | Facility: CLINIC | Age: 33
End: 2018-08-30
Payer: COMMERCIAL

## 2018-08-30 VITALS — SYSTOLIC BLOOD PRESSURE: 132 MMHG | WEIGHT: 234.8 LBS | DIASTOLIC BLOOD PRESSURE: 96 MMHG | BODY MASS INDEX: 33.69 KG/M2

## 2018-08-30 DIAGNOSIS — Z48.89 POSTOPERATIVE VISIT: Primary | ICD-10-CM

## 2018-08-30 PROCEDURE — 99024 POSTOP FOLLOW-UP VISIT: CPT | Performed by: OBSTETRICS & GYNECOLOGY

## 2018-08-30 NOTE — PROGRESS NOTES
8/27/2018  Daya Pacheco is a 33 y.o. female who presents for wound check     S/p Repeat LTCS on 8/15/18 by   Surgery c/b PPH with 1 u pRBC transfused     Wound opened last week. See by Dr. Gates and Ghada. Initially 2cm defect. Packing wound daily     On day 4 of 7 Keflex qid.    Still as some drainage/ bleeding.      PE:    Incision inspected; packing removed. No active drainage. + granulation tissue. Poss collection on L side lateral to opening.      A/P: superficial wound dehiscience    -would allow to close now by secondary intention; stop packing  -care instructions reviewed  -dry dressing; call for concerns  -complete 7 day course of Ab  - re-check 5 days

## 2018-09-04 ENCOUNTER — OFFICE VISIT (OUTPATIENT)
Dept: OBSTETRICS AND GYNECOLOGY | Facility: CLINIC | Age: 33
End: 2018-09-04
Payer: COMMERCIAL

## 2018-09-04 VITALS — DIASTOLIC BLOOD PRESSURE: 80 MMHG | SYSTOLIC BLOOD PRESSURE: 122 MMHG

## 2018-09-04 PROCEDURE — 0503F POSTPARTUM CARE VISIT: CPT | Performed by: OBSTETRICS & GYNECOLOGY

## 2018-09-04 ASSESSMENT — ENCOUNTER SYMPTOMS
CONSTIPATION: 0
PALPITATIONS: 0
DIARRHEA: 0
BRUISES/BLEEDS EASILY: 0
CHILLS: 0
WOUND: 1
SHORTNESS OF BREATH: 0
FEVER: 0
DYSURIA: 0
DEPRESSION: 0
DIZZINESS: 0

## 2018-09-04 NOTE — PROGRESS NOTES
9/4/2018  Daya Pacheco is a 33 y.o. female who presents for wound check     S/p Repeat LTCS on 8/15/18 by   Surgery c/b PPH with 1 u pRBC transfused     Postpartum wound hematoma and subsequent cellulitis. Initially 2cm defect.      Stopped packing after visit with Dr. Mtz last week  Last dose antibiotic was yesterday evening  Draining scant rust colored fluid. Surrounding skin much softer. No odor. No pain. No fever or chills     noticed small area or erythema inferior to incision today    Otherwise feeling well  Not requiring pain meds  Normal bladder and bowel function    Had rash on vulva/perinuem- bright red and itchy, seems to be resolving now      Review of Systems and Physical Exam  The following have been reviewed and updated as appropriate in this visit:      /80   LMP 11/15/2017   HPI  Review of Systems   Constitutional: Negative for chills and fever.   Respiratory: Negative for shortness of breath.    Cardiovascular: Negative for palpitations.   Gastrointestinal: Negative for constipation and diarrhea.   Genitourinary: Positive for genital sores and vaginal bleeding. Negative for dysuria and pelvic pain.   Skin: Positive for rash and wound.   Neurological: Negative for dizziness.   Hematological: Does not bruise/bleed easily.   Psychiatric/Behavioral: Negative for depression.     Physical Exam   Constitutional: She is oriented to person, place, and time. She appears well-developed and well-nourished. No distress.   Genitourinary:         Genitourinary Comments: Faint area of fading erythema over lower vulva and perineum. Few tiny areas of brighter red erythema surrounding follicles. Dry, no drainage.    Pulmonary/Chest: Effort normal.   Abdominal: Soft. She exhibits no distension and no mass. There is no tenderness. There is no rebound and no guarding.       Wound now 1.5cm wide by 5mm deep. Healthy pink granulation tissue. No drainage or odor. Borders without erythema or  induration    Adjacent 5mm x 20mm area inferior and to right of wound- well demarcated beefy red macule. No crust or drainage. Nontender. No surrounding erythema   Musculoskeletal: Normal range of motion. She exhibits no edema.   Neurological: She is alert and oriented to person, place, and time.   Skin: Skin is warm and dry. She is not diaphoretic.   Psychiatric: She has a normal mood and affect. Her behavior is normal.     34 yo post-op from c/s c/b wound hematoma/celulitis  - wound appears to be much improved from my exam last week. Healing well by secondary intention. Cellulitis resolved  - small area lateral and inferior to incision which looks more like fungal infection. This rash is similar to rash on vulva/perineum. Plan clotrimazole cream BID. Pt will draw line around area near wound, if expanding by tomorrow would consider restarting abx vs bactroban ointment.   - follow up one week or sooner prn  Return in about 1 week (around 9/11/2018) for Wound Check.  Cammie Gutiérrez MD

## 2018-09-11 ENCOUNTER — OFFICE VISIT (OUTPATIENT)
Dept: OBSTETRICS AND GYNECOLOGY | Facility: CLINIC | Age: 33
End: 2018-09-11
Payer: COMMERCIAL

## 2018-09-11 VITALS — DIASTOLIC BLOOD PRESSURE: 74 MMHG | SYSTOLIC BLOOD PRESSURE: 110 MMHG

## 2018-09-11 PROCEDURE — 0503F POSTPARTUM CARE VISIT: CPT | Performed by: OBSTETRICS & GYNECOLOGY

## 2018-09-11 NOTE — PROGRESS NOTES
9/11/2018  Daya Pacheco is a 33 y.o. female who presents for follow up from wound dehiscence    S/p Repeat LTCS on 8/15/18 by   Surgery c/b PPH with 1 u pRBC transfused     Postpartum wound hematoma and subsequent cellulitis. Initially 2cm defect.      Defect continues to close. Scant drainage now. Erythema resolved. No pain     Otherwise feeling well  Not requiring pain meds  Normal bladder and bowel function      Review of Systems and Physical Exam  The following have been reviewed and updated as appropriate in this visit:      /74   LMP 11/15/2017   HPI  Review of Systems  Physical Exam   Constitutional: She is oriented to person, place, and time. She appears well-developed and well-nourished.   Pulmonary/Chest: Effort normal.   Abdominal: Soft. She exhibits no distension and no mass. There is no tenderness. There is no guarding.   Musculoskeletal: Normal range of motion. She exhibits no edema.   Neurological: She is alert and oriented to person, place, and time.   Skin: Skin is warm and dry. No erythema (defect now 10mm wide by 5mm deep. Pink granulation tissue. No drainage. No surrounding erythema or skin changes. ).   Psychiatric: She has a normal mood and affect. Her behavior is normal.       32 yo for postpartum wound check  - wound continues to heal, improved from last visit  - continue daily dressing changes.   - follow up 2 weeks for formal postpartum visit  Return in about 2 weeks (around 9/25/2018) for Postpartum visit.  Cammie Gutiérrez MD

## 2018-09-25 ENCOUNTER — OFFICE VISIT (OUTPATIENT)
Dept: OBSTETRICS AND GYNECOLOGY | Facility: CLINIC | Age: 33
End: 2018-09-25
Payer: COMMERCIAL

## 2018-09-25 VITALS
HEIGHT: 71 IN | WEIGHT: 222.2 LBS | SYSTOLIC BLOOD PRESSURE: 110 MMHG | DIASTOLIC BLOOD PRESSURE: 84 MMHG | BODY MASS INDEX: 31.11 KG/M2

## 2018-09-25 DIAGNOSIS — N93.9 ABNORMAL UTERINE BLEEDING (AUB): ICD-10-CM

## 2018-09-25 PROCEDURE — 0503F POSTPARTUM CARE VISIT: CPT | Performed by: OBSTETRICS & GYNECOLOGY

## 2018-09-25 RX ORDER — BACLOFEN 20 MG
TABLET ORAL
COMMUNITY
End: 2019-09-25 | Stop reason: ALTCHOICE

## 2018-09-25 RX ORDER — NORETHINDRONE 0.35 MG/1
1 TABLET ORAL DAILY
Qty: 28 TABLET | Refills: 12 | Status: SHIPPED | OUTPATIENT
Start: 2018-09-25 | End: 2019-08-07 | Stop reason: ALTCHOICE

## 2018-09-25 NOTE — PROGRESS NOTES
"Visit Date: 9/25/2018   Daya Pacheco is 33 y.o. female presenting today for 6 week post partum visit. Bleeding still going on but lighter. No bowel or bladder issues. Reprots incision still not completely healed. No pp depression sxs. Not SA yet. Breast feeding; plans six months. Wants micronor for now; will call for ASAF when ready to wean.        S/p Repeat LTCS on 8/15/18 by   Surgery c/b PPH with 1 u pRBC transfused     Wound opened post op; treated conservatively w out-pt management.    Physical Exam  /84   Ht 1.791 m (5' 10.5\")   Wt 101 kg (222 lb 3.2 oz)   LMP 11/15/2017   Breastfeeding? Yes   BMI 31.43 kg/m²      General Appearance: Alert, cooperative, no acute distress  Head: Normocephalic, without obvious abnormality, atraumatic  Breast: full of milk, no masses, nontender and no discharge  Abdomen: Soft, nontender, nondistended,   no masses, no organomegaly   Incision still has about 1cm defect w granulation tissue; non-erythematous; debrided w saline  Pelvic:  Vulva: normal  Vagina: normal mucosa  Cervix: no lesions  Uterus: normal size  Adenexa: no mass, fullness, tenderness      Assessment & Plan  6 week pp exam s/p R LTCD    -slow healing sup wound dehicience: referred to wound center for eval.    -contraception: Rx Micronor/ back up first month; call for ASAF when ready to wean    Pap cureent: repeat due 2021    -AUB: prolonged pp bleeding: check labs, start Micronor; call w results    Return in about 1 year (around 9/25/2019).  Tera Fong MD    "

## 2018-09-27 ENCOUNTER — APPOINTMENT (OUTPATIENT)
Dept: LAB | Facility: HOSPITAL | Age: 33
End: 2018-09-27
Attending: OBSTETRICS & GYNECOLOGY
Payer: COMMERCIAL

## 2018-09-27 DIAGNOSIS — N93.9 ABNORMAL UTERINE BLEEDING (AUB): ICD-10-CM

## 2018-09-27 LAB
ERYTHROCYTE [DISTWIDTH] IN BLOOD BY AUTOMATED COUNT: 15.8 % (ref 11.7–14.4)
HCG SERPL-ACNC: 1.2 MIU/L
HCT VFR BLDCO AUTO: 44.1 % (ref 35–45)
HGB BLD-MCNC: 13.9 G/DL (ref 11.8–15.7)
MCH RBC QN AUTO: 27.6 PG (ref 28–33.2)
MCHC RBC AUTO-ENTMCNC: 31.5 G/DL (ref 32.2–35.5)
MCV RBC AUTO: 87.5 FL (ref 83–98)
PDW BLD AUTO: 10.5 FL (ref 9.4–12.3)
PLATELET # BLD AUTO: 223 K/UL (ref 150–369)
RBC # BLD AUTO: 5.04 M/UL (ref 3.93–5.22)
TSH SERPL DL<=0.05 MIU/L-ACNC: 1.8 MIU/L (ref 0.34–5.6)
WBC # BLD AUTO: 6.3 K/UL (ref 3.8–10.5)

## 2018-09-27 PROCEDURE — 36415 COLL VENOUS BLD VENIPUNCTURE: CPT

## 2018-09-27 PROCEDURE — 85027 COMPLETE CBC AUTOMATED: CPT

## 2018-09-27 PROCEDURE — 84702 CHORIONIC GONADOTROPIN TEST: CPT

## 2018-09-27 PROCEDURE — 84443 ASSAY THYROID STIM HORMONE: CPT

## 2018-10-15 ENCOUNTER — TELEPHONE (OUTPATIENT)
Dept: OBSTETRICS AND GYNECOLOGY | Facility: CLINIC | Age: 33
End: 2018-10-15

## 2018-10-15 DIAGNOSIS — N93.9 ABNORMAL UTERINE BLEEDING (AUB): Primary | ICD-10-CM

## 2018-10-15 NOTE — TELEPHONE ENCOUNTER
LVM, Notified RL wants pelvic u/s (I ordered in EPIC) and she should call and make appt to f/u with him after u/s is complete.

## 2018-10-15 NOTE — TELEPHONE ENCOUNTER
Started POP after 9/25 visit. Spotting has remained the same since then. Has one week left in pill pack. Incision still has a small area that has not completely closed. Hasn't yet called wound center. Had negative hcg done on 9/27. Discussed on POP spotting may be normal SE. She states RL thought it would clear up in one month but it has not changed. May need u/s vs waiting a bit longer for POP adjustment. Will discuss with RL.

## 2018-10-17 ENCOUNTER — OFFICE VISIT (OUTPATIENT)
Dept: WOUND CARE | Facility: HOSPITAL | Age: 33
End: 2018-10-17
Attending: SURGERY
Payer: COMMERCIAL

## 2018-10-17 VITALS
TEMPERATURE: 97.6 F | RESPIRATION RATE: 20 BRPM | BODY MASS INDEX: 31.5 KG/M2 | WEIGHT: 220 LBS | HEART RATE: 72 BPM | HEIGHT: 70 IN

## 2018-10-17 DIAGNOSIS — T81.89XA NON-HEALING SURGICAL WOUND, INITIAL ENCOUNTER: Primary | ICD-10-CM

## 2018-10-17 PROCEDURE — 99202 OFFICE O/P NEW SF 15 MIN: CPT | Performed by: SURGERY

## 2018-10-17 ASSESSMENT — ENCOUNTER SYMPTOMS
EYE DISCHARGE: 0
NERVOUS/ANXIOUS: 0
BRUISES/BLEEDS EASILY: 0
NUMBNESS: 0
WEAKNESS: 0
FLANK PAIN: 0
SHORTNESS OF BREATH: 0
ABDOMINAL DISTENTION: 0
HEMATURIA: 0
FEVER: 0
COLOR CHANGE: 0
RHINORRHEA: 0
WOUND: 1
JOINT SWELLING: 0
APPETITE CHANGE: 0
CONFUSION: 0
FREQUENCY: 0
POLYDIPSIA: 0
COUGH: 0
EYE REDNESS: 0
FATIGUE: 0

## 2018-10-17 ASSESSMENT — PAIN SCALES - GENERAL: PAINLEVEL: 2

## 2018-10-17 NOTE — ASSESSMENT & PLAN NOTE
I anticipate no problems with this small residual surgical wound resolving completely.    Plan:  Apply a small amount of Medihoney gel to the wound and cover with a gauze dressing secured with paper tape or Mefix.  Change at least daily and as needed.    Wash wound site thoroughly with warm water and soap to reduce chance of bacterial overgrowth and biofilm formation.

## 2018-10-17 NOTE — PROGRESS NOTES
Patient ID: Daya Pacheco                              : 1985  MRN: [id number]                                            Visit Date: 10/17/2018  Encounter Provider: NAPOLOEN Gallardo  Referring Provider: Tera Fong*    Subjective      HPI  Daya aPcheco is a 33 y.o. old female with a chief compliant of Surgical Wound and Chronic Non-healing Wound   presenting today for evaluation and management of a surgical wound following  section.  Patient underwent  section on 8/15/18.Patient seen for postoperative follow-up 18 by Dr. Jose Fong.  During this visit she was noted to have incomplete healing of her surgical wound.  She is approximately 6 weeks postpartum at that time and referred to the wound center for treatment.    The following have been reviewed and updated as appropriate in this visit:  Tobacco  Allergies  Meds  Problems  Med Hx  Surg Hx  Fam Hx  Soc Hx        Past Medical History:  has a past medical history of Anemia in pregnancy.  Past Surgical History:  has a past surgical history that includes [place] tooth extraction; Tonsillectomy; D&C first trimester / Tx incomplete / missed / septic / induced  (); D&C first trimester / Tx incomplete / missed / septic / induced  (); D&C first trimester / Tx incomplete / missed / septic / induced  ();  section, low transverse (); and Tonsilectomy, adenoidectomy, bilateral myringotomy and tubes.  Social History:  reports that she has never smoked. She has never used smokeless tobacco. She reports that she does not drink alcohol or use drugs.  Family History: family history includes No Known Problems in her father and mother.  Medications:   Current Outpatient Prescriptions:   •  fenugreek seed extract 500 mg capsule, Take by mouth., Disp: , Rfl:   •  ferrous gluconate (FERGON) 324 mg (38 mg iron) tablet, Take 38 mg by mouth daily with breakfast., Disp: , Rfl:   •   "ibuprofen (MOTRIN) 600 mg tablet, Take 1 tablet (600 mg total) by mouth every 8 (eight) hours as needed for mild pain for up to 360 doses., Disp: 50 tablet, Rfl: 0  •  norethindrone (ORTHO MICRONOR) 0.35 mg tablet, Take 1 tablet (0.35 mg total) by mouth daily., Disp: 28 tablet, Rfl: 12  •  PRENATAL VITS96/IRON FUM/FOLIC (PRE- VITAMIN) 27 mg iron- 800 mcg tablet, Take 1 tablet by mouth daily., Disp: , Rfl:   •  omeprazole OTC (PriLOSEC OTC) 20 mg EC tablet, Take 1 tablet (20 mg total) by mouth daily before breakfast. (Patient not taking: Reported on 2018 ), Disp: 90 tablet, Rfl: 1  •  sennosides-docusate sodium (SENOKOT-S) 8.6-50 mg, Take 1 tablet by mouth 2 (two) times a day for 176 doses. (Patient not taking: Reported on 2018 ), Disp: 60 tablet, Rfl: 1    Allergies: is allergic to no known allergies.     Review of Systems   Constitutional: Negative for appetite change, fatigue and fever.   HENT: Negative for congestion and rhinorrhea.    Eyes: Negative for discharge and redness.   Respiratory: Negative for cough and shortness of breath.    Cardiovascular: Negative for leg swelling.   Gastrointestinal: Negative for abdominal distention.   Endocrine: Negative for cold intolerance, heat intolerance and polydipsia.   Genitourinary: Negative for flank pain, frequency and hematuria.   Musculoskeletal: Negative for gait problem and joint swelling.   Skin: Positive for wound. Negative for color change.   Allergic/Immunologic: Negative for immunocompromised state.   Neurological: Negative for weakness and numbness.   Hematological: Does not bruise/bleed easily.   Psychiatric/Behavioral: Negative for confusion. The patient is not nervous/anxious.      Objective   Pulse 72   Temp 36.4 °C (97.6 °F)   Resp 20   Ht 1.778 m (5' 10\")   Wt 99.8 kg (220 lb)   BMI 31.57 kg/m²     Physical Exam   Constitutional: She is oriented to person, place, and time. She appears well-developed. She is cooperative. No distress. "   HENT:   Head: Normocephalic and atraumatic.   Eyes: Conjunctivae are normal. Pupils are equal, round, and reactive to light.   Neck: Trachea normal and normal range of motion. No edema and no erythema present.   Cardiovascular: Regular rhythm and intact distal pulses.    Pulmonary/Chest: Effort normal. No respiratory distress. She has no wheezes.   Abdominal: Soft. Normal appearance. There is no guarding.   Musculoskeletal: Normal range of motion. She exhibits no deformity.   Neurological: She is alert and oriented to person, place, and time.   Skin:        Psychiatric: Her speech is normal and behavior is normal. Her affect is not inappropriate. Cognition and memory are normal. She is attentive.   Nursing note and vitals reviewed.    Surgical wound lower abdomen following       Assessment/Plan    Diagnosis Plan   1. Non-healing surgical wound, initial encounter       Problem List Items Addressed This Visit        Musculoskeletal    Surgical wound, non healing - Primary     I anticipate no problems with this small residual surgical wound resolving completely.    Plan:  Apply a small amount of Medihoney gel to the wound and cover with a gauze dressing secured with paper tape or Mefix.  Change at least daily and as needed.    Wash wound site thoroughly with warm water and soap to reduce chance of bacterial overgrowth and biofilm formation.               Return if symptoms worsen or fail to improve.     NAPOLEON Jones. MD Sami

## 2018-10-17 NOTE — PATIENT INSTRUCTIONS
Wound Healing Center Instructions    MEDICATIONS     Medication Note  Continue present medications as prescribed by the Wound Healing Center or other physicians you see. To avoid any problems keep the Wound Healing Center informed each visit of any medications changes that occur.     WOUND CARE     Clean Wound with: Soap and Water and Showering   Treatment 1   Location:  Abdominal Wound    Dressing:   Apply a small amt of the Medihoney to a piece of gauze and place it over the wound                    Secure with tape    Dressing Care Frequency: Daily     Care Provider: Self and Family       Basic Principles      • Wash your hands thoroughly with soap and water and after each dressing change. If someone other than the patient changes the dressing, it’s best to wear disposable gloves.   • Do not get the wound or dressing wet.   • To shower: remove the dressing, shower with soap and water (including washing the wound - do not use a washcloth), air dry, then redress the wound.  • Do not take a tub bath  • Keep all your dressings in a clean, covered container at home to avoid dust and contamination.  • Discard used dressings in a plastic bag or covered trash container.  • Check your wound and the surrounding skin at each dressing change for redness, warmth, swelling, increased pain, foul odor, fever, pus or abnormal drainage or discharge.  • Notify Wound Healing Center if any of these changes occur - Dept: [phone].        Nutrition  • Eat a well, balanced diet with adequate protein to support wound healing.   • Take a multivitamin every day. Adequate nutrition supports healing and new tissue growth.  • All diabetic patients should strive to keep blood sugars within a normal, practical range.   • Elevated blood sugars can delay your wound healing.        ACTIVITY       Normal activity then rest and elevation  May shower  May walk    MOBILITY

## 2018-10-17 NOTE — LETTER
2018     Tera Fong MD   [address]  [address]  [facility] [place] [address]    Patient: Daya Pacheoc   YOB: 1985   Date of Visit: 10/17/2018       Dear Dr. Fong:    Thank you for referring Daya Pacheco to me for evaluation. Below are my notes for this consultation.    If you have questions, please do not hesitate to call me. I look forward to following your patient along with you.         Sincerely,        NAPOLEON Jones. MD Sami        CC: No Recipients  ANGELLA Gallardo MD  10/17/2018  2:03 PM  Signed  Patient ID: Daya Pacheco                              : 1985  MRN: [id number]                                            Visit Date: 10/17/2018  Encounter Provider: NAPOLEON Gallardo  Referring Provider: Tera Fong*    Subjective      HPI  Daya Pacheco is a 33 y.o. old female with a chief compliant of Surgical Wound and Chronic Non-healing Wound   presenting today for evaluation and management of a surgical wound following  section.  Patient underwent  section on 8/15/18.Patient seen for postoperative follow-up 18 by Dr. Jose Fong.  During this visit she was noted to have incomplete healing of her surgical wound.  She is approximately 6 weeks postpartum at that time and referred to the wound center for treatment.    The following have been reviewed and updated as appropriate in this visit:  Tobacco  Allergies  Meds  Problems  Med Hx  Surg Hx  Fam Hx  Soc Hx        Past Medical History:  has a past medical history of Anemia in pregnancy.  Past Surgical History:  has a past surgical history that includes [place] tooth extraction; Tonsillectomy; D&C first trimester / Tx incomplete / missed / septic / induced  (); D&C first trimester / Tx incomplete / missed / septic / induced  (); D&C first trimester / Tx incomplete / missed / septic / induced  ();  section, low  transverse (2015); and Tonsilectomy, adenoidectomy, bilateral myringotomy and tubes.  Social History:  reports that she has never smoked. She has never used smokeless tobacco. She reports that she does not drink alcohol or use drugs.  Family History: family history includes No Known Problems in her father and mother.  Medications:   Current Outpatient Prescriptions:   •  fenugreek seed extract 500 mg capsule, Take by mouth., Disp: , Rfl:   •  ferrous gluconate (FERGON) 324 mg (38 mg iron) tablet, Take 38 mg by mouth daily with breakfast., Disp: , Rfl:   •  ibuprofen (MOTRIN) 600 mg tablet, Take 1 tablet (600 mg total) by mouth every 8 (eight) hours as needed for mild pain for up to 360 doses., Disp: 50 tablet, Rfl: 0  •  norethindrone (ORTHO MICRONOR) 0.35 mg tablet, Take 1 tablet (0.35 mg total) by mouth daily., Disp: 28 tablet, Rfl: 12  •  PRENATAL VITS96/IRON FUM/FOLIC (PRE-ZOË VITAMIN) 27 mg iron- 800 mcg tablet, Take 1 tablet by mouth daily., Disp: , Rfl:   •  omeprazole OTC (PriLOSEC OTC) 20 mg EC tablet, Take 1 tablet (20 mg total) by mouth daily before breakfast. (Patient not taking: Reported on 2018 ), Disp: 90 tablet, Rfl: 1  •  sennosides-docusate sodium (SENOKOT-S) 8.6-50 mg, Take 1 tablet by mouth 2 (two) times a day for 176 doses. (Patient not taking: Reported on 2018 ), Disp: 60 tablet, Rfl: 1    Allergies: is allergic to no known allergies.     Review of Systems   Constitutional: Negative for appetite change, fatigue and fever.   HENT: Negative for congestion and rhinorrhea.    Eyes: Negative for discharge and redness.   Respiratory: Negative for cough and shortness of breath.    Cardiovascular: Negative for leg swelling.   Gastrointestinal: Negative for abdominal distention.   Endocrine: Negative for cold intolerance, heat intolerance and polydipsia.   Genitourinary: Negative for flank pain, frequency and hematuria.   Musculoskeletal: Negative for gait problem and joint swelling.   Skin:  "Positive for wound. Negative for color change.   Allergic/Immunologic: Negative for immunocompromised state.   Neurological: Negative for weakness and numbness.   Hematological: Does not bruise/bleed easily.   Psychiatric/Behavioral: Negative for confusion. The patient is not nervous/anxious.      Objective   Pulse 72   Temp 36.4 °C (97.6 °F)   Resp 20   Ht 1.778 m (5' 10\")   Wt 99.8 kg (220 lb)   BMI 31.57 kg/m²     Physical Exam   Constitutional: She is oriented to person, place, and time. She appears well-developed. She is cooperative. No distress.   HENT:   Head: Normocephalic and atraumatic.   Eyes: Conjunctivae are normal. Pupils are equal, round, and reactive to light.   Neck: Trachea normal and normal range of motion. No edema and no erythema present.   Cardiovascular: Regular rhythm and intact distal pulses.    Pulmonary/Chest: Effort normal. No respiratory distress. She has no wheezes.   Abdominal: Soft. Normal appearance. There is no guarding.   Musculoskeletal: Normal range of motion. She exhibits no deformity.   Neurological: She is alert and oriented to person, place, and time.   Skin:        Psychiatric: Her speech is normal and behavior is normal. Her affect is not inappropriate. Cognition and memory are normal. She is attentive.   Nursing note and vitals reviewed.    Surgical wound lower abdomen following       Assessment/Plan    Diagnosis Plan   1. Non-healing surgical wound, initial encounter       Problem List Items Addressed This Visit        Musculoskeletal    Surgical wound, non healing - Primary     I anticipate no problems with this small residual surgical wound resolving completely.    Plan:  Apply a small amount of Medihoney gel to the wound and cover with a gauze dressing secured with paper tape or Mefix.  Change at least daily and as needed.    Wash wound site thoroughly with warm water and soap to reduce chance of bacterial overgrowth and biofilm formation.         "       Return if symptoms worsen or fail to improve.     NAPOLEON Jones. MD Sami

## 2018-10-25 ENCOUNTER — HOSPITAL ENCOUNTER (OUTPATIENT)
Dept: RADIOLOGY | Age: 33
Discharge: HOME | End: 2018-10-25
Attending: NURSE PRACTITIONER
Payer: COMMERCIAL

## 2018-10-25 ENCOUNTER — TELEPHONE (OUTPATIENT)
Dept: OBSTETRICS AND GYNECOLOGY | Facility: CLINIC | Age: 33
End: 2018-10-25

## 2018-10-25 DIAGNOSIS — N93.9 ABNORMAL UTERINE BLEEDING (AUB): ICD-10-CM

## 2018-10-25 PROCEDURE — 76830 TRANSVAGINAL US NON-OB: CPT

## 2018-10-26 ENCOUNTER — CONSULT (OUTPATIENT)
Dept: OBSTETRICS AND GYNECOLOGY | Facility: CLINIC | Age: 33
End: 2018-10-26
Payer: COMMERCIAL

## 2018-10-26 ENCOUNTER — PREP FOR CASE (OUTPATIENT)
Dept: OBSTETRICS AND GYNECOLOGY | Facility: CLINIC | Age: 33
End: 2018-10-26

## 2018-10-26 DIAGNOSIS — N93.9 ABNORMAL UTERINE BLEEDING (AUB): Primary | ICD-10-CM

## 2018-10-26 PROCEDURE — 99242 OFF/OP CONSLTJ NEW/EST SF 20: CPT | Performed by: OBSTETRICS & GYNECOLOGY

## 2018-10-26 NOTE — PROGRESS NOTES
Visit Date: 10/26/2018   Daya Pacheco is 33 y.o. female presenting today for preoperative consultation.  Patient is status post a repeat  on August 15.  The operative note indicates difficulty with the placental removal and use of a [place]ilar curettage.  Patient also reports that she needed a D&C after her previous  which was done at an outside facility again for retained products of conception.    Since delivery the patient's been having intermittent breakthrough bleeding which has not resolved spontaneously.  She had serum labs checked at the end of September which showed a negative pregnancy test and euthyroid status.  No evidence of anemia.  Because of the persistence of the abnormal bleeding a pelvic ultrasound was obtained which shows abnormally distended endometrium up to 3 cm with possible retained products of conception again.  Patient is here today accompanied by her  plan for scheduling for a D&C next week.  Of note the patient also had an issue with delayed closure of a small portion of her  incision.  She saw Dr. Gallardo in consultation at the wound center and happily that issue has resolved.  The following have been reviewed and updated as appropriate in this visit:      There were no vitals taken for this visit.  Menstrual History:  OB History      Para Term  AB Living    6 2 2 0 4 2    SAB TAB Ectopic Multiple Live Births    4 0 0 0 2         No LMP recorded.       Medications:   Current Outpatient Prescriptions:   •  fenugreek seed extract 500 mg capsule, Take by mouth., Disp: , Rfl:   •  ferrous gluconate (FERGON) 324 mg (38 mg iron) tablet, Take 38 mg by mouth daily with breakfast., Disp: , Rfl:   •  ibuprofen (MOTRIN) 600 mg tablet, Take 1 tablet (600 mg total) by mouth every 8 (eight) hours as needed for mild pain for up to 360 doses., Disp: 50 tablet, Rfl: 0  •  norethindrone (ORTHO MICRONOR) 0.35 mg tablet, Take 1 tablet (0.35 mg total) by mouth  daily., Disp: 28 tablet, Rfl: 12  •  omeprazole OTC (PriLOSEC OTC) 20 mg EC tablet, Take 1 tablet (20 mg total) by mouth daily before breakfast. (Patient not taking: Reported on 2018 ), Disp: 90 tablet, Rfl: 1  •  PRENATAL VITS96/IRON FUM/FOLIC (PRE- VITAMIN) 27 mg iron- 800 mcg tablet, Take 1 tablet by mouth daily., Disp: , Rfl:   •  sennosides-docusate sodium (SENOKOT-S) 8.6-50 mg, Take 1 tablet by mouth 2 (two) times a day for 176 doses. (Patient not taking: Reported on 2018 ), Disp: 60 tablet, Rfl: 1    Allergies: is allergic to no known allergies.     Past Medical History:  has a past medical history of Anemia in pregnancy.  Past Surgical History:  has a past surgical history that includes [place] tooth extraction; Tonsillectomy; D&C first trimester / Tx incomplete / missed / septic / induced  (); D&C first trimester / Tx incomplete / missed / septic / induced  (); D&C first trimester / Tx incomplete / missed / septic / induced  ();  section, low transverse (); and Tonsilectomy, adenoidectomy, bilateral myringotomy and tubes.  Family History: family history includes No Known Problems in her father and mother.  Social History:  reports that she has never smoked. She has never used smokeless tobacco. She reports that she does not drink alcohol or use drugs.  The patient is sexually active.    Physical Exam  There were no vitals taken for this visit.     Assessment & Plan  Abnormal uterine bleeding with probable retained products of conception.  Hysteroscopy D&C is recommended.  The procedure and its attendant risks including but not limited to, bleeding, infection, uterine scar mid scarring with subsequent infertility, adverse reaction to the anesthesia, incomplete removal of all the tissue and need for repeat procedure were all explained to the patient and her  today in detail.  Informed consent was given and signed.  Patient is going to be  scheduling with Aisha for next week.  We will repeat her quantitative beta-hCG preoperatively as well.    No Follow-up on file.  Tera Fong MD

## 2018-10-26 NOTE — H&P
Gynecology History and Physical    HPI     Patient is a 33 y.o. female who presents with persistent abnormal uterine bleeding 3 months status post repeat .  Recent pelvic ultrasound is suspicious for possible retained products of conception.  The patient is scheduled for hysteroscopy D&C..     OB History:   Obstetric History       T2      L2     SAB4   TAB0   Ectopic0   Multiple0   Live Births2       # Outcome Date GA Lbr Miky/2nd Weight Sex Delivery Anes PTL Lv   6 Term 08/15/18 39w0d  4.67 kg F CS-LTranv Spinal N IVON      Name: ESCOBAR,GIRL      Apgar1:  7                Apgar5: 9   5 2017     SAB      4 2017     SAB      3 Term 11/15/15 39w0d  5.33 kg F CS-LTranv Spinal  IVON   2 2014 7w0d          1 2011             Medical History:   Past Medical History:   Diagnosis Date   • Anemia in pregnancy        Surgical History:   Past Surgical History:   Procedure Laterality Date   •  SECTION, LOW TRANSVERSE     • D&C FIRST TRIMESTER / TX INCOMPLETE / MISSED / SEPTIC / INDUCED      • D&C FIRST TRIMESTER / TX INCOMPLETE / MISSED / SEPTIC / INDUCED      • D&C FIRST TRIMESTER / TX INCOMPLETE / MISSED / SEPTIC / INDUCED      • TONSILECTOMY, ADENOIDECTOMY, BILATERAL MYRINGOTOMY AND TUBES      no problems   • TONSILLECTOMY     • WISDOM TOOTH EXTRACTION         Social History:   Social History     Social History Narrative   • No narrative on file       Family History:   Family History   Problem Relation Age of Onset   • No Known Problems Mother    • No Known Problems Father        Allergies: No known allergies    Prior to Admission medications    Medication Sig Start Date End Date Taking? Authorizing Provider   fenugreek seed extract 500 mg capsule Take by mouth.    ProviderKaterina MD   ferrous gluconate (FERGON) 324 mg (38 mg iron) tablet Take 38 mg by mouth daily with breakfast.    ProviderKaterina MD   ibuprofen  (MOTRIN) 600 mg tablet Take 1 tablet (600 mg total) by mouth every 8 (eight) hours as needed for mild pain for up to 360 doses. 18   Pamela Nick MD   norethindrone (ORTHO MICRONOR) 0.35 mg tablet Take 1 tablet (0.35 mg total) by mouth daily. 18  Tera Fong MD   omeprazole OTC (PriLOSEC OTC) 20 mg EC tablet Take 1 tablet (20 mg total) by mouth daily before breakfast.  Patient not taking: Reported on 18  Cammie Gutiérrez MD   PRENATAL VITS96/IRON FUM/FOLIC (PRE- VITAMIN) 27 mg iron- 800 mcg tablet Take 1 tablet by mouth daily.    Provider, MD Katerina   sennosides-docusate sodium (SENOKOT-S) 8.6-50 mg Take 1 tablet by mouth 2 (two) times a day for 176 doses.  Patient not taking: Reported on 18  Lashonda Gates MD       Review of Systems  All other systems reviewed and negative except as noted in the HPI.    Objective     Vital Signs for the last 24 hours:  BP: ()/()   Arterial Line BP: ()/()     Exam  General Appearance: Alert, cooperative, no acute distress  Head: Normocephalic, without obvious abnormality, atraumatic  Throat: Lips, mucosa, and tongue appear normal  Neck: no adenopathy  Thyroid: no enlargement/tenderness/nodules  Lungs: Clear to auscultation bilaterally, respirations unlabored  Heart: Regular rate and rhythm, S1 and S2 normal, no murmur, rub or gallop  Breast: Deferred  Abdomen: Soft, nontender, nondistended, bowel sounds active all four quadrants,  no masses, no organomegaly  Back: no CVA tenderness  Genitalia: no lesions or masses, no uterine, cervical, or adnexal tenderness, no discharge or vaginal bleeding present  Rectal: Deferred  Extremities: no edema or calf tenderness  Musculoskeletal: No injury or deformity  Skin: Skin color, texture, turgor normal, no rashes or lesions  Lymph nodes: inguinal and axillary nodes normal  Neurologic: Deferred    Labs  I have reviewed the patient's labs.   Current labs are within normal limits.    Imaging  Significant findings include: Abnormal endometrium measuring up to 3 cm with a vascular tissue.        Assessment/Plan     Code Status: [unfilled]    Persistent abnormal uterine bleeding and ultrasound findings suspicious for possible retained products of conception.    Hysteroscopy D&C scheduled for endometrial evaluation.

## 2018-10-26 NOTE — TELEPHONE ENCOUNTER
----- Message from JESSICA Posada sent at 10/25/2018  4:57 PM EDT -----  Here us ultrasound. Thanks.

## 2018-10-30 ENCOUNTER — APPOINTMENT (OUTPATIENT)
Dept: LAB | Age: 33
End: 2018-10-30
Attending: OBSTETRICS & GYNECOLOGY
Payer: COMMERCIAL

## 2018-10-30 ENCOUNTER — TRANSCRIBE ORDERS (OUTPATIENT)
Dept: OBSTETRICS AND GYNECOLOGY | Facility: CLINIC | Age: 33
End: 2018-10-30

## 2018-10-30 LAB
ABO + RH BLD: NORMAL
BLD GP AB SCN SERPL QL: NEGATIVE
BLOOD BANK CMNT PATIENT-IMP: NORMAL
D AG BLD QL: POSITIVE
ERYTHROCYTE [DISTWIDTH] IN BLOOD BY AUTOMATED COUNT: 15.6 % (ref 11.7–14.4)
HCG SERPL-ACNC: <0.6 MIU/L
HCT VFR BLDCO AUTO: 43.4 % (ref 35–45)
HGB BLD-MCNC: 13.9 G/DL (ref 11.8–15.7)
LABORATORY COMMENT REPORT: NORMAL
MCH RBC QN AUTO: 27.9 PG (ref 28–33.2)
MCHC RBC AUTO-ENTMCNC: 32 G/DL (ref 32.2–35.5)
MCV RBC AUTO: 87 FL (ref 83–98)
PDW BLD AUTO: 10.5 FL (ref 9.4–12.3)
PLATELET # BLD AUTO: 240 K/UL (ref 150–369)
RBC # BLD AUTO: 4.99 M/UL (ref 3.93–5.22)
WBC # BLD AUTO: 6.01 K/UL (ref 3.8–10.5)

## 2018-10-30 PROCEDURE — 85027 COMPLETE CBC AUTOMATED: CPT

## 2018-10-30 PROCEDURE — 86901 BLOOD TYPING SEROLOGIC RH(D): CPT

## 2018-10-30 PROCEDURE — 84702 CHORIONIC GONADOTROPIN TEST: CPT

## 2018-10-30 PROCEDURE — 36415 COLL VENOUS BLD VENIPUNCTURE: CPT

## 2018-11-01 ENCOUNTER — ANESTHESIA EVENT (OUTPATIENT)
Dept: SURGERY | Facility: HOSPITAL | Age: 33
Setting detail: HOSPITAL OUTPATIENT SURGERY
End: 2018-11-01
Payer: COMMERCIAL

## 2018-11-02 ENCOUNTER — HOSPITAL ENCOUNTER (OUTPATIENT)
Facility: HOSPITAL | Age: 33
Setting detail: HOSPITAL OUTPATIENT SURGERY
Discharge: HOME | End: 2018-11-02
Attending: OBSTETRICS & GYNECOLOGY | Admitting: OBSTETRICS & GYNECOLOGY
Payer: COMMERCIAL

## 2018-11-02 ENCOUNTER — ANESTHESIA (OUTPATIENT)
Dept: SURGERY | Facility: HOSPITAL | Age: 33
Setting detail: HOSPITAL OUTPATIENT SURGERY
End: 2018-11-02
Payer: COMMERCIAL

## 2018-11-02 VITALS
HEIGHT: 70 IN | HEART RATE: 80 BPM | RESPIRATION RATE: 18 BRPM | TEMPERATURE: 98.1 F | SYSTOLIC BLOOD PRESSURE: 104 MMHG | OXYGEN SATURATION: 95 % | WEIGHT: 225 LBS | BODY MASS INDEX: 32.21 KG/M2 | DIASTOLIC BLOOD PRESSURE: 52 MMHG

## 2018-11-02 LAB
ERYTHROCYTE [DISTWIDTH] IN BLOOD BY AUTOMATED COUNT: 15 % (ref 11.7–14.4)
HCT VFR BLDCO AUTO: 39.6 % (ref 35–45)
HGB BLD-MCNC: 13.5 G/DL (ref 11.8–15.7)
MCH RBC QN AUTO: 28.7 PG (ref 28–33.2)
MCHC RBC AUTO-ENTMCNC: 34.1 G/DL (ref 32.2–35.5)
MCV RBC AUTO: 84.3 FL (ref 83–98)
PDW BLD AUTO: 10.2 FL (ref 9.4–12.3)
PLATELET # BLD AUTO: 185 K/UL (ref 150–369)
RBC # BLD AUTO: 4.7 M/UL (ref 3.93–5.22)
WBC # BLD AUTO: 5.54 K/UL (ref 3.8–10.5)

## 2018-11-02 PROCEDURE — 63600000 HC DRUGS/DETAIL CODE: Performed by: NURSE ANESTHETIST, CERTIFIED REGISTERED

## 2018-11-02 PROCEDURE — 85027 COMPLETE CBC AUTOMATED: CPT | Performed by: OBSTETRICS & GYNECOLOGY

## 2018-11-02 PROCEDURE — 37000002 HC ANESTHESIA MAC: Performed by: OBSTETRICS & GYNECOLOGY

## 2018-11-02 PROCEDURE — 36000012 HC OR LEVEL 2 EA ADDL MIN: Performed by: OBSTETRICS & GYNECOLOGY

## 2018-11-02 PROCEDURE — 71000011 HC PACU PHASE 1 EA ADDL MIN: Performed by: OBSTETRICS & GYNECOLOGY

## 2018-11-02 PROCEDURE — 36000002 HC OR LEVEL 2 INITIAL 30MIN: Performed by: OBSTETRICS & GYNECOLOGY

## 2018-11-02 PROCEDURE — 71000012 HC PACU PHASE 2 EA ADDL MIN: Performed by: OBSTETRICS & GYNECOLOGY

## 2018-11-02 PROCEDURE — 0UJD8ZZ INSPECTION OF UTERUS AND CERVIX, VIA NATURAL OR ARTIFICIAL OPENING ENDOSCOPIC: ICD-10-PCS | Performed by: OBSTETRICS & GYNECOLOGY

## 2018-11-02 PROCEDURE — 36415 COLL VENOUS BLD VENIPUNCTURE: CPT | Performed by: OBSTETRICS & GYNECOLOGY

## 2018-11-02 PROCEDURE — 25800000 HC PHARMACY IV SOLUTIONS: Performed by: NURSE ANESTHETIST, CERTIFIED REGISTERED

## 2018-11-02 PROCEDURE — 25800000 HC PHARMACY IV SOLUTIONS: Performed by: OBSTETRICS & GYNECOLOGY

## 2018-11-02 PROCEDURE — 25000000 HC PHARMACY GENERAL: Performed by: OBSTETRICS & GYNECOLOGY

## 2018-11-02 PROCEDURE — 58558 HYSTEROSCOPY BIOPSY: CPT | Performed by: OBSTETRICS & GYNECOLOGY

## 2018-11-02 PROCEDURE — 71000002 HC PACU PHASE 2 INITIAL 30MIN: Performed by: OBSTETRICS & GYNECOLOGY

## 2018-11-02 PROCEDURE — 88305 TISSUE EXAM BY PATHOLOGIST: CPT | Performed by: OBSTETRICS & GYNECOLOGY

## 2018-11-02 PROCEDURE — 0UDB7ZX EXTRACTION OF ENDOMETRIUM, VIA NATURAL OR ARTIFICIAL OPENING, DIAGNOSTIC: ICD-10-PCS | Performed by: OBSTETRICS & GYNECOLOGY

## 2018-11-02 PROCEDURE — 71000001 HC PACU PHASE 1 INITIAL 30MIN: Performed by: OBSTETRICS & GYNECOLOGY

## 2018-11-02 RX ORDER — DEXTROSE 40 %
15-30 GEL (GRAM) ORAL AS NEEDED
Status: DISCONTINUED | OUTPATIENT
Start: 2018-11-02 | End: 2018-11-02 | Stop reason: HOSPADM

## 2018-11-02 RX ORDER — KETOROLAC TROMETHAMINE 30 MG/ML
INJECTION, SOLUTION INTRAMUSCULAR; INTRAVENOUS AS NEEDED
Status: DISCONTINUED | OUTPATIENT
Start: 2018-11-02 | End: 2018-11-02 | Stop reason: SURG

## 2018-11-02 RX ORDER — PROPOFOL 10 MG/ML
INJECTION, EMULSION INTRAVENOUS CONTINUOUS PRN
Status: DISCONTINUED | OUTPATIENT
Start: 2018-11-02 | End: 2018-11-02 | Stop reason: SURG

## 2018-11-02 RX ORDER — ONDANSETRON HYDROCHLORIDE 2 MG/ML
4 INJECTION, SOLUTION INTRAVENOUS EVERY 8 HOURS PRN
Status: DISCONTINUED | OUTPATIENT
Start: 2018-11-02 | End: 2018-11-02 | Stop reason: HOSPADM

## 2018-11-02 RX ORDER — DEXTROSE 50 % IN WATER (D50W) INTRAVENOUS SYRINGE
25 AS NEEDED
Status: DISCONTINUED | OUTPATIENT
Start: 2018-11-02 | End: 2018-11-02 | Stop reason: HOSPADM

## 2018-11-02 RX ORDER — HYDROMORPHONE HYDROCHLORIDE 2 MG/ML
0.5 INJECTION, SOLUTION INTRAMUSCULAR; INTRAVENOUS; SUBCUTANEOUS
Status: DISCONTINUED | OUTPATIENT
Start: 2018-11-02 | End: 2018-11-02 | Stop reason: HOSPADM

## 2018-11-02 RX ORDER — HYDROMORPHONE HYDROCHLORIDE 2 MG/ML
.25-.5 INJECTION, SOLUTION INTRAMUSCULAR; INTRAVENOUS; SUBCUTANEOUS
Status: DISCONTINUED | OUTPATIENT
Start: 2018-11-02 | End: 2018-11-02 | Stop reason: HOSPADM

## 2018-11-02 RX ORDER — IBUPROFEN 200 MG
16-32 TABLET ORAL AS NEEDED
Status: DISCONTINUED | OUTPATIENT
Start: 2018-11-02 | End: 2018-11-02 | Stop reason: HOSPADM

## 2018-11-02 RX ORDER — MIDAZOLAM HYDROCHLORIDE 2 MG/2ML
INJECTION, SOLUTION INTRAMUSCULAR; INTRAVENOUS AS NEEDED
Status: DISCONTINUED | OUTPATIENT
Start: 2018-11-02 | End: 2018-11-02 | Stop reason: SURG

## 2018-11-02 RX ORDER — DOXYCYCLINE 100 MG/1
100 CAPSULE ORAL 2 TIMES DAILY
Qty: 14 CAPSULE | Refills: 0 | Status: SHIPPED | OUTPATIENT
Start: 2018-11-02 | End: 2019-09-25 | Stop reason: ALTCHOICE

## 2018-11-02 RX ORDER — PROPOFOL 200MG/20ML
SYRINGE (ML) INTRAVENOUS AS NEEDED
Status: DISCONTINUED | OUTPATIENT
Start: 2018-11-02 | End: 2018-11-02 | Stop reason: SURG

## 2018-11-02 RX ORDER — ONDANSETRON HYDROCHLORIDE 2 MG/ML
4 INJECTION, SOLUTION INTRAVENOUS
Status: DISCONTINUED | OUTPATIENT
Start: 2018-11-02 | End: 2018-11-02 | Stop reason: HOSPADM

## 2018-11-02 RX ORDER — FENTANYL CITRATE 50 UG/ML
INJECTION, SOLUTION INTRAMUSCULAR; INTRAVENOUS AS NEEDED
Status: DISCONTINUED | OUTPATIENT
Start: 2018-11-02 | End: 2018-11-02 | Stop reason: SURG

## 2018-11-02 RX ORDER — DEXAMETHASONE SODIUM PHOSPHATE 4 MG/ML
INJECTION, SOLUTION INTRA-ARTICULAR; INTRALESIONAL; INTRAMUSCULAR; INTRAVENOUS; SOFT TISSUE AS NEEDED
Status: DISCONTINUED | OUTPATIENT
Start: 2018-11-02 | End: 2018-11-02 | Stop reason: SURG

## 2018-11-02 RX ORDER — ACETAMINOPHEN 325 MG/1
975 TABLET ORAL EVERY 6 HOURS
Status: DISCONTINUED | OUTPATIENT
Start: 2018-11-02 | End: 2018-11-02 | Stop reason: HOSPADM

## 2018-11-02 RX ORDER — SODIUM CHLORIDE 9 MG/ML
INJECTION, SOLUTION INTRAVENOUS CONTINUOUS PRN
Status: DISCONTINUED | OUTPATIENT
Start: 2018-11-02 | End: 2018-11-02 | Stop reason: SURG

## 2018-11-02 RX ORDER — OXYCODONE HYDROCHLORIDE 5 MG/1
5-10 TABLET ORAL EVERY 4 HOURS PRN
Status: DISCONTINUED | OUTPATIENT
Start: 2018-11-02 | End: 2018-11-02 | Stop reason: HOSPADM

## 2018-11-02 RX ORDER — SODIUM CHLORIDE, SODIUM LACTATE, POTASSIUM CHLORIDE, CALCIUM CHLORIDE 600; 310; 30; 20 MG/100ML; MG/100ML; MG/100ML; MG/100ML
INJECTION, SOLUTION INTRAVENOUS CONTINUOUS
Status: DISCONTINUED | OUTPATIENT
Start: 2018-11-02 | End: 2018-11-02 | Stop reason: HOSPADM

## 2018-11-02 RX ORDER — ONDANSETRON HYDROCHLORIDE 2 MG/ML
INJECTION, SOLUTION INTRAVENOUS AS NEEDED
Status: DISCONTINUED | OUTPATIENT
Start: 2018-11-02 | End: 2018-11-02 | Stop reason: SURG

## 2018-11-02 RX ORDER — FENTANYL CITRATE 50 UG/ML
50 INJECTION, SOLUTION INTRAMUSCULAR; INTRAVENOUS
Status: DISCONTINUED | OUTPATIENT
Start: 2018-11-02 | End: 2018-11-02 | Stop reason: HOSPADM

## 2018-11-02 RX ADMIN — FENTANYL CITRATE 25 MCG: 50 INJECTION, SOLUTION INTRAMUSCULAR; INTRAVENOUS at 07:34

## 2018-11-02 RX ADMIN — DOXYCYCLINE 100 MG: 100 INJECTION, POWDER, LYOPHILIZED, FOR SOLUTION INTRAVENOUS at 07:28

## 2018-11-02 RX ADMIN — ONDANSETRON 4 MG: 2 INJECTION INTRAMUSCULAR; INTRAVENOUS at 07:37

## 2018-11-02 RX ADMIN — DEXAMETHASONE SODIUM PHOSPHATE 4 MG: 4 INJECTION, SOLUTION INTRA-ARTICULAR; INTRALESIONAL; INTRAMUSCULAR; INTRAVENOUS; SOFT TISSUE at 07:37

## 2018-11-02 RX ADMIN — KETOROLAC TROMETHAMINE 30 MG: 30 INJECTION, SOLUTION INTRAMUSCULAR at 07:54

## 2018-11-02 RX ADMIN — MIDAZOLAM HYDROCHLORIDE 2 MG: 1 INJECTION, SOLUTION INTRAMUSCULAR; INTRAVENOUS at 07:29

## 2018-11-02 RX ADMIN — FENTANYL CITRATE 25 MCG: 50 INJECTION, SOLUTION INTRAMUSCULAR; INTRAVENOUS at 08:03

## 2018-11-02 RX ADMIN — PROPOFOL 50 MG: 10 INJECTION, EMULSION INTRAVENOUS at 07:34

## 2018-11-02 RX ADMIN — FENTANYL CITRATE 25 MCG: 50 INJECTION, SOLUTION INTRAMUSCULAR; INTRAVENOUS at 08:08

## 2018-11-02 RX ADMIN — FENTANYL CITRATE 25 MCG: 50 INJECTION, SOLUTION INTRAMUSCULAR; INTRAVENOUS at 07:40

## 2018-11-02 RX ADMIN — SODIUM CHLORIDE: 9 INJECTION, SOLUTION INTRAVENOUS at 07:29

## 2018-11-02 RX ADMIN — PROPOFOL 150 MCG/KG/MIN: 10 INJECTION, EMULSION INTRAVENOUS at 07:34

## 2018-11-02 ASSESSMENT — PAIN SCALES - GENERAL: PAIN_LEVEL: 0

## 2018-11-02 ASSESSMENT — PAIN - FUNCTIONAL ASSESSMENT: PAIN_FUNCTIONAL_ASSESSMENT: NO/DENIES PAIN

## 2018-11-02 NOTE — ANESTHESIA PREPROCEDURE EVALUATION
Anesthesia ROS/MED HX      Endo/Other  Body Habitus: Obese  ROS/MED HX Comments:    Endo: Retained products of conception       Past Surgical History:   Procedure Laterality Date   •  SECTION, LOW TRANSVERSE     • D&C FIRST TRIMESTER / TX INCOMPLETE / MISSED / SEPTIC / INDUCED      • D&C FIRST TRIMESTER / TX INCOMPLETE / MISSED / SEPTIC / INDUCED      • D&C FIRST TRIMESTER / TX INCOMPLETE / MISSED / SEPTIC / INDUCED      • TONSILECTOMY, ADENOIDECTOMY, BILATERAL MYRINGOTOMY AND TUBES      no problems   • TONSILLECTOMY     • WISDOM TOOTH EXTRACTION       Past Medical History:   Diagnosis Date   • Anemia in pregnancy          Physical Exam    Airway   Mallampati: I   TM distance: >3 FB   Neck ROM: full  Cardiovascular    Rhythm: regular   Rate: normal        Anesthesia Plan    Plan: MAC     Airway: natural airway / supplemental oxygen   ASA 2  Anesthetic plan and risks discussed with: patient  Induction:    intravenous   Postop Plan:   Patient Disposition: phase II then home   Pain Management: IV analgesics  Comments:    Plan: MAC with GA backup

## 2018-11-02 NOTE — DISCHARGE INSTRUCTIONS
Dilation and Curettage   Discharge Instructions    What to Expect  It is normal to have light bleeding. This can occur immediately after the procedure or in a few days.   It is normal to experience some mild cramping after the procedure but this usually does not last for more than a few days. You may take Acetaminophen (Tylenol) or Ibuprofen (Motrin, Advil) as directed for pain.      Activity   Please do not get in a swimming pool, hot tub or tub bath for at least 2-3 days after your procedure.   Please delay sexual intercourse for at least 5-7 days.  You may resume normal activities in 1-2 days. Please wait an additional 1-2 days for strenuous exercise.  You may resume driving 24 hours after anesthesia.      PLEASE CALL THE OFFICEIF YOU EXPERIENCE ANY OF THE FOLLOWING:   - Heavy bleeding (using one pad an hour)  - Fever greater than 100.4  - Foul smelling vaginal discharge   - Worsening pain or any other concerns

## 2018-11-02 NOTE — OP NOTE
Preop diagnosis: Recurrent abnormal bleeding postpartum with concern for retained products of conception   postoperative diagnosis: same  Procedure: Dilation and curettage, Hysteroscopy,   Surgeon: MD Bibi  Assist: none  Anesthesia: MAC, GETA and Local  EBL: 20  IVF: 500cc crystalloid  Drains: Straight catheterized, clear urine, 200cc  Specimen: Necrotic placental tissue and endometrial curettings  Complications: Fundal uterine perforation  Sponge and needle count: correct x2  Disposition: stable      PROCEDURE:   Daya Pacheco presented to [facility] [place], operated by [facility] on 11/2/2018. The surgeon and patient were mutually identified in the preoperative area. Her consents were reconfirmed and her questions were again answered. She was taken to the operating suite and administered MAC anesthesia . She was placed in the dorsal lithotomy position. A bimanual exam under anesthesia revealed slightly enlarged boggy uterus. She was prepped and draped in the usual sterile fashion. A timeout was performed.  Doxycycline 100 mg IV was given x1 preoperatively.    Her bladder was sterilely drained. A Hernandez speculum and Roxy retractor were placed in the patient’s vagina. The anterior lip of the cervix was grasped with a single-tooth tenaculum. The uterus was sounded to 10cm. Her cervix was then serially dilated using Lazo dilators to accommodate a 5mm hysteroscope, which was advanced through the cervix into the uterine fundus and uterine distention was applied.  There was a large amount of necrotic appearing tissue within the endometrial canal.  Fundus could not be well seen because of the amount of tissue.  Bilateral tubal ostia were seen. The hysteroscope was removed.  The cervix was further dilated with Lazo dilators until a polyp forceps could be introduced and multiple fragments of necrotic tissue was removed and sent for pathologic evaluation.  A gentle Curettage was then carried out with a medium sharp curette.  At the fundus to  the left of the midline there was suspicion for perforation at that point the curettage was stopped.  Hysteroscope was reintroduced and confirmation of a defect in the fundus of the uterus just to the left of midline was seen.  No active bleeding was noted.  This point the procedure was concluded.   Bleeding at the cervical os was noted to be minimal. The tenaculum was removed from the cervix and the site was hemostatic. All sponges, instruments, needles were removed from the patient’s vagina. All counts were found to be correct. The patient was awoken in the operating room and taken to the PACU awake and in stable condition.  She will be kept for observation for the next 4 hours and a CBC will be checked prior to discharge to ensure hemodynamic stability.

## 2018-11-02 NOTE — ANESTHESIA POSTPROCEDURE EVALUATION
Patient: Daya Pacheco    Procedure Summary     Date:  11/02/18 Room / Location:  [facility]    Anesthesia Start:  0729 Anesthesia Stop:  0809    Procedure:  D&C, HYSTEROSCOPY (N/A ) Diagnosis:       Retained products of conception after delivery with complications      (O73.1)    Surgeon:  Tera Fong MD Responsible Provider:  Houston Adler MD    Anesthesia Type:  MAC ASA Status:  2          Anesthesia Type: MAC  PACU Vitals  11/2/2018 0759 - 11/2/2018 0859      11/2/2018 0810             BP: (!)  123/58    Temp: 36.2 °C (97.1 °F)    Pulse: 68    Resp: (!)  23    SpO2: 97 %            Anesthesia Post Evaluation    Pain score: 0  Pain management: satisfactory to patient  Mode of pain management: IV medication  Patient location during evaluation: PACU  Patient participation: complete - patient participated  Level of consciousness: awake and alert  Cardiovascular status: acceptable  Airway Patency: adequate  Respiratory status: acceptable  Hydration status: stable  Anesthetic complications: no

## 2018-11-02 NOTE — PROGRESS NOTES
"Patient doing well postoperatively. Denies current pain.    BP (!) 116/59   Pulse 87   Temp 36.7 °C (98.1 °F) (Temporal)   Resp 18   Ht 1.778 m (5' 10\")   Wt 102 kg (225 lb)   SpO2 96%   Breastfeeding? Yes Comment: pumped at 0600  BMI 32.28 kg/m²     CBC Results       11/02/18 10/30/18 09/27/18                    1153 1021 1031         WBC 5.54 6.01 6.30         RBC 4.70 4.99 5.04         HGB 13.5 13.9 13.9         HCT 39.6 43.4 44.1         MCV 84.3 87.0 87.5         MCH 28.7 27.9 (L) 27.6 (L)         MCHC 34.1 32.0 (L) 31.5 (L)          240 223                         Will d/c home w/ follow up instructions as patient is clinically and hemodynamically stable. D/W Dr. Mtz. Patient to take doxycycline x 7 days.   "

## 2018-11-03 ENCOUNTER — TELEPHONE (OUTPATIENT)
Dept: OBSTETRICS AND GYNECOLOGY | Facility: CLINIC | Age: 33
End: 2018-11-03

## 2018-11-03 RX ORDER — NORETHINDRONE ACETATE AND ETHINYL ESTRADIOL .02; 1 MG/1; MG/1
1 TABLET ORAL DAILY
Qty: 21 TABLET | Refills: 12 | Status: SHIPPED | OUTPATIENT
Start: 2018-11-03 | End: 2018-12-07 | Stop reason: SDUPTHER

## 2018-11-03 NOTE — TELEPHONE ENCOUNTER
Pt contacted s/p D&C yesterday w uterine perf. Feels fine. Very light bleeding; no abnl/pelvic pain. No fever. Taking doxy 100mg BID x 7. Will Rx ASAF to help promote endometrial healing.  Pt to schedule post op appt w me in about 2 weeks. Call in the interim with any concerns.

## 2018-11-05 LAB
CASE RPRT: NORMAL
CLINICAL INFO: NORMAL
PATH REPORT.FINAL DX SPEC: NORMAL
PATH REPORT.GROSS SPEC: NORMAL

## 2018-11-06 ENCOUNTER — TELEPHONE (OUTPATIENT)
Dept: OBSTETRICS AND GYNECOLOGY | Facility: CLINIC | Age: 33
End: 2018-11-06

## 2018-11-06 NOTE — TELEPHONE ENCOUNTER
Pt N of path results from recent D&C. Still having light bleeding; plans to start ASAF today. Post op appt sched 11/19.

## 2018-11-13 ENCOUNTER — TELEPHONE (OUTPATIENT)
Dept: OBSTETRICS AND GYNECOLOGY | Facility: CLINIC | Age: 33
End: 2018-11-13

## 2018-11-13 NOTE — TELEPHONE ENCOUNTER
Spoke w pt, D&C 1.5 weeks ago w perf and incomplete procedure.  Pt now having increased bleeding, described as heavy cycle. Plan to be seen tomorrow instead of 11/19.

## 2018-11-14 ENCOUNTER — OFFICE VISIT (OUTPATIENT)
Dept: OBSTETRICS AND GYNECOLOGY | Facility: CLINIC | Age: 33
End: 2018-11-14
Payer: COMMERCIAL

## 2018-11-14 VITALS — DIASTOLIC BLOOD PRESSURE: 84 MMHG | SYSTOLIC BLOOD PRESSURE: 122 MMHG | WEIGHT: 228.2 LBS | BODY MASS INDEX: 32.74 KG/M2

## 2018-11-14 DIAGNOSIS — N93.9 ABNORMAL UTERINE BLEEDING (AUB): ICD-10-CM

## 2018-11-14 DIAGNOSIS — Z48.89 POSTOPERATIVE VISIT: Primary | ICD-10-CM

## 2018-11-14 PROCEDURE — 99024 POSTOP FOLLOW-UP VISIT: CPT | Performed by: OBSTETRICS & GYNECOLOGY

## 2018-11-14 NOTE — PROGRESS NOTES
Visit Date: 11/14/2018   Daya Pacheco is 33 y.o. female presenting today for post op visit s/p D&C for prolonged AUB, suspected retained POC s/p C/S. Pt had small fundal perforation during D&C.  Pt given doxycycline prophlaxis post op and started on low dose OCP to help promote endometrial healing.    Pt presents today POD #12. She reports ongoing VB; about 3 pads per day. Varies from bright red to brown. Taking ASAF. No c/os of abnl pain, N/V, fever.     Pathology: focal fibrin and blood; benign endometrial tissue; no chorionic villi       Physical Exam  /84   Wt 104 kg (228 lb 3.2 oz)   BMI 32.74 kg/m²      General Appearance: Alert, cooperative, no acute distress    Abn soft, ND, NT  Incision now completely healed    V and V wnl    Cvx w some old blood at os; no active bleeding; no CMT    Ut AV NSS, NT; no adnexal mass            Assessment & Plan  Post op D &C w perf. Ongoing BTB. Pt instructed to stop ASAF. Option of expectant management vs. Progestin reviewed. Prefers expectant management. Still breast feeding.    -recc f/u pelvic u/s 2-3 weeks    -ok to travel to [place] for Thanksgiving    -call me if VB bleeding acutely worsens, otherwise f/u 3 weeks    Return in about 3 weeks (around 12/5/2018) for follow up .  Tera Fong MD

## 2018-12-04 ENCOUNTER — HOSPITAL ENCOUNTER (OUTPATIENT)
Dept: RADIOLOGY | Age: 33
Discharge: HOME | End: 2018-12-04
Attending: OBSTETRICS & GYNECOLOGY
Payer: COMMERCIAL

## 2018-12-04 DIAGNOSIS — N93.9 ABNORMAL UTERINE BLEEDING (AUB): ICD-10-CM

## 2018-12-04 PROCEDURE — 76830 TRANSVAGINAL US NON-OB: CPT

## 2018-12-06 NOTE — PROGRESS NOTES
Visit Date: 12/7/2018   Daya Pacheco is 33 y.o. female presenting today for post op visit s/p D&C 4+ weeks ago for retained POC. Pt feeling much better; bleeding stopped about two weeks ago. No menses yet. Breast feeding. Plans through the spring. No contraception- wants to start.     F/u pelvic u/s shows improved endometrial thickness down from 5cm to 2.2cm; still somewhat heterogeneous. No free fluid.    Pathology: from D&C pred fibrin and blood; no chorionic villi      Physical Exam  /80   Wt 103 kg (226 lb 9.6 oz)   BMI 32.51 kg/m²      General Appearance: Alert, cooperative, no acute distress      Assessment & Plan  4 weeks s/p D&C for poss retained POC    -pt clinically much better; no further AUB  -f/u pelvic u/s reviewed w pt; endometrium still a little thick; small poss of more retained tissue but more likely just prolif endometrium and perhaps some clot  -recc cycle endometrium w ASAF- Rx Loestrin 1/20; start Sunday  -call me w menses- will discuss and probably switch back to progestin pill if ASAF affects milk supply    Return in about 1 year (around 12/7/2019).  Tera Fong MD

## 2018-12-07 ENCOUNTER — OFFICE VISIT (OUTPATIENT)
Dept: OBSTETRICS AND GYNECOLOGY | Facility: CLINIC | Age: 33
End: 2018-12-07
Payer: COMMERCIAL

## 2018-12-07 VITALS — SYSTOLIC BLOOD PRESSURE: 112 MMHG | DIASTOLIC BLOOD PRESSURE: 80 MMHG | WEIGHT: 226.6 LBS | BODY MASS INDEX: 32.51 KG/M2

## 2018-12-07 DIAGNOSIS — Z48.89 POSTOPERATIVE VISIT: Primary | ICD-10-CM

## 2018-12-07 PROCEDURE — 99024 POSTOP FOLLOW-UP VISIT: CPT | Performed by: OBSTETRICS & GYNECOLOGY

## 2018-12-07 RX ORDER — NORETHINDRONE ACETATE AND ETHINYL ESTRADIOL .02; 1 MG/1; MG/1
1 TABLET ORAL DAILY
Qty: 21 TABLET | Refills: 1 | Status: SHIPPED | OUTPATIENT
Start: 2018-12-07 | End: 2019-01-02 | Stop reason: SDUPTHER

## 2019-01-02 ENCOUNTER — TELEPHONE (OUTPATIENT)
Dept: OBSTETRICS AND GYNECOLOGY | Facility: CLINIC | Age: 34
End: 2019-01-02

## 2019-01-02 RX ORDER — NORETHINDRONE ACETATE AND ETHINYL ESTRADIOL .02; 1 MG/1; MG/1
1 TABLET ORAL DAILY
Qty: 21 TABLET | Refills: 4 | Status: SHIPPED | OUTPATIENT
Start: 2019-01-02 | End: 2019-09-25 | Stop reason: ALTCHOICE

## 2019-01-02 NOTE — TELEPHONE ENCOUNTER
Pt s/p Loestrin 1/20 x 1 month. Had BTB after missing  Pill and doubling up. Just had menses after completing first pack. Somewhat heavy. No effect on breast milk supply. Recc cont on ASAF. Refill sent to pharmacy.

## 2019-01-10 ENCOUNTER — HOSPITAL ENCOUNTER (OUTPATIENT)
Facility: CLINIC | Age: 34
Discharge: HOME | End: 2019-01-10
Attending: FAMILY MEDICINE
Payer: COMMERCIAL

## 2019-01-10 VITALS
RESPIRATION RATE: 15 BRPM | OXYGEN SATURATION: 98 % | DIASTOLIC BLOOD PRESSURE: 80 MMHG | SYSTOLIC BLOOD PRESSURE: 120 MMHG | HEART RATE: 69 BPM | TEMPERATURE: 98.3 F

## 2019-01-10 DIAGNOSIS — J06.9 UPPER RESPIRATORY TRACT INFECTION, UNSPECIFIED TYPE: Primary | ICD-10-CM

## 2019-01-10 LAB — S PYO AG THROAT QL: NEGATIVE

## 2019-01-10 PROCEDURE — 99213 OFFICE O/P EST LOW 20 MIN: CPT | Performed by: FAMILY MEDICINE

## 2019-01-10 PROCEDURE — S9083 URGENT CARE CENTER GLOBAL: HCPCS | Performed by: FAMILY MEDICINE

## 2019-01-10 PROCEDURE — 87880 STREP A ASSAY W/OPTIC: CPT | Performed by: FAMILY MEDICINE

## 2019-01-10 ASSESSMENT — ENCOUNTER SYMPTOMS
STRIDOR: 0
FEVER: 0
EYES NEGATIVE: 1
SINUS PAIN: 0
HEADACHES: 0
NUMBNESS: 0
WEAKNESS: 0
PHOTOPHOBIA: 0
NECK STIFFNESS: 0
CHOKING: 0
SHORTNESS OF BREATH: 0
VOMITING: 0
EYE REDNESS: 0
EYE DISCHARGE: 0
FACIAL SWELLING: 0
DIARRHEA: 0
EYE PAIN: 0
DIZZINESS: 0
MYALGIAS: 0
CHILLS: 0
ABDOMINAL PAIN: 0

## 2019-01-10 NOTE — DISCHARGE INSTRUCTIONS
Rapid strep test negative.  Your sore throat is likely explained by something other than a bacterial infection, so support your own immune system (rest, fluids) and take measures to minimize the symptoms, which can include the following (unless you have a medical condition that makes this unsafe, which we would have reviewed):   -consume only softer foods or just liquids, to minimize painful swallowing,  -for sore throat, chloraseptic spray or lozenges,  -for pain or fever, acetaminophen (Tylenol), or ibuprofen (e.g. Motrin) (with food),    -for ear pain (if applicable), if you have any history of, or suspicion of, allergies, can try nasal steroid spray (e.g. Flonase)

## 2019-01-10 NOTE — ED PROVIDER NOTES
History  Chief Complaint   Patient presents with   • URI     approx 2 weeks of scratchy sore throat, sinus congestion (nighttime painful, but not severe), cough, hoarseness.  Sore throat primarily at night.      No fever.    5mo with RSV.     here 1 week ago, dx'd with sinusitis.   5yo with rhinorrhea alone.    Cough not a/w sob, pleuritic pain, or hemoptysis.        Wonders what she should do, if anything.             Past Medical History:   Diagnosis Date   • Anemia in pregnancy        Past Surgical History:   Procedure Laterality Date   •  SECTION, LOW TRANSVERSE     • D&C FIRST TRIMESTER / TX INCOMPLETE / MISSED / SEPTIC / INDUCED      • D&C FIRST TRIMESTER / TX INCOMPLETE / MISSED / SEPTIC / INDUCED      • D&C FIRST TRIMESTER / TX INCOMPLETE / MISSED / SEPTIC / INDUCED      • TONSILECTOMY, ADENOIDECTOMY, BILATERAL MYRINGOTOMY AND TUBES      no problems   • TONSILLECTOMY     • WISDOM TOOTH EXTRACTION         Family History   Problem Relation Age of Onset   • No Known Problems Mother    • No Known Problems Father        Social History   Substance Use Topics   • Smoking status: Never Smoker   • Smokeless tobacco: Never Used   • Alcohol use No       Review of Systems   Constitutional: Negative for chills and fever.   HENT: Negative for ear discharge, ear pain, facial swelling and sinus pain.    Eyes: Negative.  Negative for photophobia, pain, discharge, redness (except that expected from sleepless nights due to caring for 5mo child not sleeping through the note) and visual disturbance.   Respiratory: Negative for choking, shortness of breath and stridor.    Gastrointestinal: Negative for abdominal pain, diarrhea and vomiting.   Musculoskeletal: Negative for myalgias and neck stiffness.   Neurological: Negative for dizziness, weakness, numbness and headaches.        No lethargy       Physical Exam  ED Triage Vitals [01/10/19 1055]   Temp Heart Rate Resp BP SpO2    36.8 °C (98.3 °F) 69 15 120/80 98 %      Temp src Heart Rate Source Patient Position BP Location FiO2 (%) (Set)   -- -- -- -- --       Physical Exam   Constitutional: She is oriented to person, place, and time. She appears well-developed and well-nourished. No distress.   HENT:   Right Ear: External ear normal. No mastoid tenderness.   Left Ear: External ear normal. No mastoid tenderness.   Nose: Rhinorrhea present. No epistaxis. Right sinus exhibits no maxillary sinus tenderness and no frontal sinus tenderness. Left sinus exhibits no maxillary sinus tenderness and no frontal sinus tenderness.   Mouth/Throat: Uvula is midline and oropharynx is clear and moist. No oral lesions. No trismus in the jaw. No uvula swelling. No oropharyngeal exudate, posterior oropharyngeal edema, posterior oropharyngeal erythema or tonsillar abscesses. Tonsils are 0 on the right. Tonsils are 0 on the left. No tonsillar exudate.   Eyes: Conjunctivae are normal. No scleral icterus.   Neck: Neck supple. No tracheal deviation present.   Cardiovascular: Normal rate, regular rhythm and normal heart sounds.    Pulmonary/Chest: Effort normal. No stridor. No respiratory distress. She has no decreased breath sounds. She has no wheezes. She has no rhonchi. She has no rales.   Abdominal: She exhibits no distension.   Lymphadenopathy:     She has no cervical adenopathy.   Neurological: She is alert and oriented to person, place, and time.   Skin: She is not diaphoretic.         Procedures  Procedures    UC Course  Clinical Impressions as of Andrew 10 1146   Upper respiratory tract infection, unspecified type       MDM  Number of Diagnoses or Management Options  Upper respiratory tract infection, unspecified type:   Diagnosis management comments: Viral URI likely.  Supportive.  RS neg.  (centor 0/4).  Culture not indicated.                  Jg Thompson MD  01/10/19 7086

## 2019-04-30 RX ORDER — NORETHINDRONE ACETATE AND ETHINYL ESTRADIOL .02; 1 MG/1; MG/1
1 TABLET ORAL DAILY
Qty: 21 TABLET | Refills: 4 | Status: CANCELLED | OUTPATIENT
Start: 2019-04-30 | End: 2019-07-29

## 2019-04-30 RX ORDER — NORETHINDRONE ACETATE AND ETHINYL ESTRADIOL 1.5-30(21)
1 KIT ORAL DAILY
Qty: 84 TABLET | Refills: 2 | Status: SHIPPED | OUTPATIENT
Start: 2019-04-30 | End: 2019-08-07 | Stop reason: SDUPTHER

## 2019-07-15 ENCOUNTER — TELEPHONE (OUTPATIENT)
Dept: OBSTETRICS AND GYNECOLOGY | Facility: CLINIC | Age: 34
End: 2019-07-15

## 2019-08-06 ENCOUNTER — TELEPHONE (OUTPATIENT)
Dept: OBSTETRICS AND GYNECOLOGY | Facility: CLINIC | Age: 34
End: 2019-08-06

## 2019-08-06 NOTE — TELEPHONE ENCOUNTER
Patient wants to go on the Birth control pill she was on previous to getting pregnant.  She has no idea what it was.  I called patient she knows that it is a 28 day pill but that is all she really knows.  Patient is going to try to call [facility] and get the name of the pill before tomorrow morning.

## 2019-08-07 ENCOUNTER — TELEPHONE (OUTPATIENT)
Dept: OBSTETRICS AND GYNECOLOGY | Facility: CLINIC | Age: 34
End: 2019-08-07

## 2019-08-07 RX ORDER — NORETHINDRONE ACETATE AND ETHINYL ESTRADIOL 1.5-30(21)
1 KIT ORAL DAILY
Qty: 84 TABLET | Refills: 0 | Status: SHIPPED | OUTPATIENT
Start: 2019-08-07 | End: 2019-11-05 | Stop reason: SDUPTHER

## 2019-08-07 NOTE — TELEPHONE ENCOUNTER
Spoke with pt and states was on Junel 1.5/30 Fe and would like to restart this, sending 3 months over, is due for AV end of September, will transfer pt up front to schedule in.

## 2019-09-25 ENCOUNTER — OFFICE VISIT (OUTPATIENT)
Dept: FAMILY MEDICINE | Facility: CLINIC | Age: 34
End: 2019-09-25
Payer: COMMERCIAL

## 2019-09-25 VITALS
HEIGHT: 70 IN | WEIGHT: 225 LBS | SYSTOLIC BLOOD PRESSURE: 122 MMHG | DIASTOLIC BLOOD PRESSURE: 82 MMHG | HEART RATE: 64 BPM | TEMPERATURE: 98.2 F | RESPIRATION RATE: 14 BRPM | BODY MASS INDEX: 32.21 KG/M2 | OXYGEN SATURATION: 99 %

## 2019-09-25 DIAGNOSIS — Z00.00 ROUTINE GENERAL MEDICAL EXAMINATION AT A HEALTH CARE FACILITY: Primary | ICD-10-CM

## 2019-09-25 PROBLEM — O35.9XX0 SUSPECTED FETAL ANOMALY, ANTEPARTUM: Status: RESOLVED | Noted: 2018-04-04 | Resolved: 2019-09-25

## 2019-09-25 PROBLEM — O36.60X0 MACROSOMIA AFFECTING MANAGEMENT OF MOTHER: Status: RESOLVED | Noted: 2018-08-15 | Resolved: 2019-09-25

## 2019-09-25 PROBLEM — Z36.86 ENCOUNTER FOR ANTENATAL SCREENING FOR CERVICAL LENGTH: Status: RESOLVED | Noted: 2018-04-04 | Resolved: 2019-09-25

## 2019-09-25 PROBLEM — T81.89XA SURGICAL WOUND, NON HEALING: Status: RESOLVED | Noted: 2018-10-17 | Resolved: 2019-09-25

## 2019-09-25 PROBLEM — O46.92 SECOND TRIMESTER BLEEDING: Status: RESOLVED | Noted: 2018-04-04 | Resolved: 2019-09-25

## 2019-09-25 PROBLEM — Z3A.20 20 WEEKS GESTATION OF PREGNANCY: Status: RESOLVED | Noted: 2018-04-04 | Resolved: 2019-09-25

## 2019-09-25 PROCEDURE — 99395 PREV VISIT EST AGE 18-39: CPT | Mod: GC | Performed by: STUDENT IN AN ORGANIZED HEALTH CARE EDUCATION/TRAINING PROGRAM

## 2019-09-25 ASSESSMENT — ENCOUNTER SYMPTOMS
MUSCULOSKELETAL NEGATIVE: 1
NEUROLOGICAL NEGATIVE: 1
GASTROINTESTINAL NEGATIVE: 1
RESPIRATORY NEGATIVE: 1
PSYCHIATRIC NEGATIVE: 1
CONSTITUTIONAL NEGATIVE: 1
CARDIOVASCULAR NEGATIVE: 1

## 2019-09-25 NOTE — PROGRESS NOTES
Subjective      Patient ID: Daya Pacheco is a 34 y.o. female here for Annual Exam.    Healthcare Maintenance Questions  STI Concern No   Feel Safe at Home Yes   Diet Vegetarian   Exercise Yes   # Days/Week Moderate-Greater Intensity 4-5   Minutes spent at that activity level 60 - [employer] elliptical, rower & weights   Seat Belts Yes   Sunscreen Yes   Smoke/CO Detectors Yes   Guns in the home Yes - locked in a safe - combo known to  and patient     Screenings  Colonoscopy/Fit N/A   Mammogram N/A   PAP GYN - [facility] - next appt in 1 week   DEXA N/A   Lung Ca Screening  (55-78 y/o, current smoker or quit < 15 years ago, > 30 pack/years) N/A   HCV (Born [id number]) N/A       [facility]   Patient Active Problem List   Diagnosis   • Routine general medical examination at a health care facility     Past Surgical History:   Procedure Laterality Date   •  SECTION, LOW TRANSVERSE     •  SECTION, LOW TRANSVERSE     • D&C FIRST TRIMESTER / TX INCOMPLETE / MISSED / SEPTIC / INDUCED      • D&C FIRST TRIMESTER / TX INCOMPLETE / MISSED / SEPTIC / INDUCED      • D&C FIRST TRIMESTER / TX INCOMPLETE / MISSED / SEPTIC / INDUCED      • TONSILECTOMY, ADENOIDECTOMY, BILATERAL MYRINGOTOMY AND TUBES      no problems   • TONSILLECTOMY     • WISDOM TOOTH EXTRACTION         Current Outpatient Prescriptions:   •  norethindrone-e.estradiol-iron (JUNEL FE 1.5/30, 28,) 1.5 mg-30 mcg (21)/75 mg (7) per tablet, Take 1 tablet by mouth daily., Disp: 84 tablet, Rfl: 0    Allergies   Allergen Reactions   • No Known Allergies        Family History   Problem Relation Age of Onset   • No Known Problems Biological Mother    • No Known Problems Biological Father      Social History     Social History   • Marital status:      Spouse name: Rowdy Pacheco   • Number of children: N/A   • Years of education: N/A     Occupational History   • Mental health therapist      Social History  "Main Topics   • Smoking status: Never Smoker   • Smokeless tobacco: Never Used   • Alcohol use No   • Drug use: No   • Sexual activity: Yes     Partners: Male     Birth control/ protection: OCP     Other Topics Concern   • None     Social History Narrative   • None         Current Outpatient Prescriptions:   •  norethindrone-e.estradiol-iron (JUNEL FE 1.5/30, 28,) 1.5 mg-30 mcg (21)/75 mg (7) per tablet, Take 1 tablet by mouth daily., Disp: 84 tablet, Rfl: 0    Immunization History   Administered Date(s) Administered   • Influenza Vaccine Quadrivalent Preservative Free 6-35 Months 01/18/2015   • Tdap 09/25/2018      Vaccines  Influenza October Annually   Tdap 9/2018   Shingrix/Zostavax N/A   Pneumovax N/A   Prevnar N/A       The following have been reviewed and updated as appropriate in this visit:  Tobacco  Allergies  Meds  Problems  Soc Hx          Review of systems  Review of Systems   Constitutional: Negative.    HENT: Negative.    Respiratory: Negative.    Cardiovascular: Negative.    Gastrointestinal: Negative.    Genitourinary: Negative.    Musculoskeletal: Negative.    Neurological: Negative.    Psychiatric/Behavioral: Negative.        Objective   Vitals:    09/25/19 1546   BP: 122/82   BP Location: Right upper arm   Patient Position: Sitting   Pulse: 64   Resp: 14   Temp: 36.8 °C (98.2 °F)   TempSrc: Oral   SpO2: 99%   Weight: 102 kg (225 lb)   Height: 1.778 m (5' 10\")     Body mass index is 32.28 kg/m².    Physical Exam   Constitutional: She appears well-developed and well-nourished. No distress.   HENT:   Head: Normocephalic and atraumatic.   Right Ear: External ear normal.   Left Ear: External ear normal.   Eyes: Conjunctivae and EOM are normal. Right eye exhibits no discharge. Left eye exhibits no discharge.   Cardiovascular: Normal rate, regular rhythm and normal heart sounds.    No murmur heard.  Pulmonary/Chest: Effort normal and breath sounds normal. She has no wheezes. She has no rales. "   Abdominal: Soft. Bowel sounds are normal. She exhibits no distension. There is no guarding.   Skin: She is not diaphoretic.   Vitals reviewed.      Labs  Lipids From Biometric Screening 7/8/19  HDL = 40  Total Cholesterol = 148  TC/HDL Ratio = 3.7   FBS/A1C Lab Results   Component Value Date    HGBA1C 5.0 02/12/2018      PSA No results found for: PSA    Eye Exam Within 1 year   Dentist Every 6 months          Assessment/Plan   Problem List Items Addressed This Visit        Other    Routine general medical examination at a health care facility - Primary     Assessment:  · 34 y.o. female who is in good health, presenting for an annual wellness visit.  · No chronic conditions to review.  · Medications reconciled.  · Problem list reconciled.    Plan:  · Medications:  · Continue current medications as prescribed.  · Lifestyle Recommendations:  · Balanced diet, extra virgin olive oil, fiber, exercise discussed.  · Dental and vision exams recommended regularly.  · Sunscreen recommended.  · Safety Recommendations:  · Seatbelts/helmets/not texting and driving discussed.  · Next visit in 1 year for wellness  · Preventive measures discussed                 Sami Dejesus MD  09/25/19  9:22 PM

## 2019-09-25 NOTE — PATIENT INSTRUCTIONS
You were seen at [facility] on 09/25/19 for your annual wellness visit. Here are some recommendations:    · Lifestyle recommendations:  · Diet: have a balanced diet of fruits and vegetables, protein (lean meat, fish, nuts, beans, quinoa), use extra virgin olive oil when cooking, 25 g of fiber daily.  · Check out http://www.myplate.gov for some pointers!  · Exercise:   · Continue to get 150 minutes of moderate exercise per week (cardio, weight training, flexibility, whatever you enjoy!)  · Other things to keep you healthy:  · Dental check ups twice per year   · Eye exams once per year  · Skin: Use sunscreen - SPF >15, check skin moles regularly  · Safety:   · Wear a seatbelt!  · Wear a helmet (when on a bike/motorcycle)!  · Please, please no texting and driving.  · Food diary - it's a thing.  · 'MyFitnessPal' or 'LoseIt!'  · Try to keep one for ~1-2 weeks. Here is my reasoning, as we discussed:  · No one ever lost weight without changing their diet.  · Can't change your diet without knowing what your diet is.  · Therefore, it makes sense to start with a food diary.

## 2019-09-25 NOTE — PROGRESS NOTES
No current complaints.   On exam she is WDWN in NAD   Chest clear Heart RRR, no murmur.  No edema No gross neuro deficit. Affect normal.   I agree with Dr Dejesus's assessment and plan.

## 2019-09-26 ASSESSMENT — ENCOUNTER SYMPTOMS
FLANK PAIN: 0
ACTIVITY CHANGE: 0
COLOR CHANGE: 0
DIAPHORESIS: 0
DYSURIA: 0
APPETITE CHANGE: 0
UNEXPECTED WEIGHT CHANGE: 0
ABDOMINAL DISTENTION: 0
FATIGUE: 0
CHILLS: 0
FEVER: 0
HEMATURIA: 0
FREQUENCY: 0
DIFFICULTY URINATING: 0

## 2019-09-26 NOTE — ASSESSMENT & PLAN NOTE
Assessment:  · 34 y.o. female who is in good health, presenting for an annual wellness visit.  · No chronic conditions to review.  · Medications reconciled.  · Problem list reconciled.    Plan:  · Medications:  · Continue current medications as prescribed.  · Lifestyle Recommendations:  · Balanced diet, extra virgin olive oil, fiber, exercise discussed.  · Dental and vision exams recommended regularly.  · Sunscreen recommended.  · Safety Recommendations:  · Seatbelts/helmets/not texting and driving discussed.  · Next visit in 1 year for wellness  · Preventive measures discussed

## 2019-09-26 NOTE — PROGRESS NOTES
Subjective      Patient ID: Daya Pacheco is a 34 y.o.     There were no vitals filed for this visit.  There is no height or weight on file to calculate BMI.  No LMP recorded.    Happy with ASAF's    D&C with perf for possible retained POC's post C/S done 2018    The following have been reviewed and updated as appropriate in this visit:    Current Outpatient Prescriptions:   •  norethindrone-e.estradiol-iron (JUNEL FE 1.5/30, 28,) 1.5 mg-30 mcg (21)/75 mg (7) per tablet, Take 1 tablet by mouth daily., Disp: 84 tablet, Rfl: 0  Past Medical History:   Diagnosis Date   • Anemia in pregnancy      Past Surgical History:   Procedure Laterality Date   •  SECTION, LOW TRANSVERSE     •  SECTION, LOW TRANSVERSE     • D&C FIRST TRIMESTER / TX INCOMPLETE / MISSED / SEPTIC / INDUCED      • D&C FIRST TRIMESTER / TX INCOMPLETE / MISSED / SEPTIC / INDUCED      • D&C FIRST TRIMESTER / TX INCOMPLETE / MISSED / SEPTIC / INDUCED      • TONSILECTOMY, ADENOIDECTOMY, BILATERAL MYRINGOTOMY AND TUBES      no problems   • TONSILLECTOMY     • WISDOM TOOTH EXTRACTION       Obstetric History       T2      L2     SAB4   TAB0   Ectopic0   Multiple0   Live Births2       # Outcome Date GA Lbr Miky/2nd Weight Sex Delivery Anes PTL Lv   6 Term 08/15/18 39w0d  4.67 kg (10 lb 4.7 oz) F CS-LTranv Spinal N IVON      Name: ESCOBAR,GIRL      Apgar1:  7                Apgar5: 9   5 2017     SAB      4 SAB 2017     SAB      3 Term 11/15/15 39w0d  5.33 kg (11 lb 12 oz) F CS-LTranv Spinal  IVON   2 2014 7w0d    SAB      1 2011     SAB           Family History   Problem Relation Age of Onset   • No Known Problems Biological Mother    • No Known Problems Biological Father      Review of Systems   Constitutional: Negative for activity change, appetite change, chills, diaphoresis, fatigue, fever and unexpected weight change.   Gastrointestinal: Negative for abdominal  distention.   Endocrine: Negative for cold intolerance and heat intolerance.   Genitourinary: Negative for decreased urine volume, difficulty urinating, dysuria, enuresis, flank pain, frequency, genital sores, hematuria and urgency.   Skin: Negative for color change, pallor and rash.       Objective     Physical Exam   Constitutional: She is oriented to person, place, and time. She appears well-developed and well-nourished.   Genitourinary: Vagina normal and uterus normal.   External female genitalia normal.   Urethral meatus normal.   Urethra normal.   Normal bladder.   Vagina normal.   Cervix exam normal.  Uterus is normal.   Adnexa normal. Rectovaginal exam normal.   Neck: Neck supple. No thyromegaly present.   Pulmonary/Chest: Right breast exhibits no inverted nipple, no mass, no nipple discharge, no skin change and no tenderness. Left breast exhibits no inverted nipple, no mass, no nipple discharge, no skin change and no tenderness. Breasts are symmetrical. There is no breast swelling.   Abdominal: Soft. She exhibits no distension.   Lymphadenopathy:     She has no cervical adenopathy.   Neurological: She is alert and oriented to person, place, and time.   Skin: Skin is warm. No rash noted.   Psychiatric: She has a normal mood and affect.   Vitals reviewed.      Assessment/Plan     NL GYN EXAM  Happy with ASAF's  Pap due in 2021

## 2019-10-01 ENCOUNTER — OFFICE VISIT (OUTPATIENT)
Dept: OBSTETRICS AND GYNECOLOGY | Facility: CLINIC | Age: 34
End: 2019-10-01
Payer: COMMERCIAL

## 2019-10-01 VITALS
HEIGHT: 70 IN | WEIGHT: 227.2 LBS | SYSTOLIC BLOOD PRESSURE: 126 MMHG | DIASTOLIC BLOOD PRESSURE: 82 MMHG | BODY MASS INDEX: 32.53 KG/M2

## 2019-10-01 DIAGNOSIS — Z01.419 WOMEN'S ANNUAL ROUTINE GYNECOLOGICAL EXAMINATION: Primary | ICD-10-CM

## 2019-10-01 PROCEDURE — 99395 PREV VISIT EST AGE 18-39: CPT | Performed by: NURSE PRACTITIONER

## 2019-10-01 ASSESSMENT — ENCOUNTER SYMPTOMS
GASTROINTESTINAL NEGATIVE: 1
CARDIOVASCULAR NEGATIVE: 1
CONSTITUTIONAL NEGATIVE: 1
NEUROLOGICAL NEGATIVE: 1
RESPIRATORY NEGATIVE: 1

## 2019-10-01 NOTE — PROGRESS NOTES
10/1/2019  Daya Pacheco is a 34 y.o. female who presents for     Gyn History:    Patient's last menstrual period was 2019 (approximate).     Contraception: OC's  Gardasil:   Menopause:   Incontinence: n/a  Pap: 2018: neg x 2  Mammo:   Dexa:   Colonoscopy:     PMH: none  PSH: D&C x 6 for retained POC, Tonsils and WT  FH: neg  Gyn hx: regular cycles. Normal Pap hx  SA w no c/o    Therapist: [facility]    considering another pregnancy      OB History:   Obstetric History       T2      L2     SAB4   TAB0   Ectopic0   Multiple0   Live Births2       # Outcome Date GA Lbr Miky/2nd Weight Sex Delivery Anes PTL Lv   6 Term 08/15/18 39w0d  4.67 kg (10 lb 4.7 oz) F CS-LTranv Spinal N IVON      Name: ESCOBAR,GIRL      Apgar1:  7                Apgar5: 9   5 SAB 2017     SAB      4 SAB 2017     SAB      3 Term 11/15/15 39w0d  5.33 kg (11 lb 12 oz) F CS-LTranv Spinal  IVON   2 2014 7w0d    SAB      1 SAB      SAB               Medical History:   Past Medical History:   Diagnosis Date   • Anemia in pregnancy        Surgical History:   Past Surgical History:   Procedure Laterality Date   •  SECTION, LOW TRANSVERSE     •  SECTION, LOW TRANSVERSE     • D&C FIRST TRIMESTER / TX INCOMPLETE / MISSED / SEPTIC / INDUCED      • D&C FIRST TRIMESTER / TX INCOMPLETE / MISSED / SEPTIC / INDUCED      • D&C FIRST TRIMESTER / TX INCOMPLETE / MISSED / SEPTIC / INDUCED      • TONSILECTOMY, ADENOIDECTOMY, BILATERAL MYRINGOTOMY AND TUBES      no problems   • TONSILLECTOMY     • WISDOM TOOTH EXTRACTION         Allergies: No known allergies    Prior to Admission medications    Medication Sig Start Date End Date Taking? Authorizing Provider   norethindrone-e.estradiol-iron (JUNEL FE 1.5/30, 28,) 1.5 mg-30 mcg (21)/75 mg (7) per tablet Take 1 tablet by mouth daily. 19 Yes Barbara Starr CRNP        Social History:   Social History     Social  "History   • Marital status:      Spouse name: Rowdy Pacheco   • Number of children: N/A   • Years of education: N/A     Occupational History   • Mental health therapist      Social History Main Topics   • Smoking status: Never Smoker   • Smokeless tobacco: Never Used   • Alcohol use No   • Drug use: No   • Sexual activity: Yes     Partners: Male     Birth control/ protection: OCP     Other Topics Concern   • None     Social History Narrative   • None        Family History:   Family History   Problem Relation Age of Onset   • No Known Problems Biological Mother    • No Known Problems Biological Father        Review of Systems and Physical Exam  The following have been reviewed and updated as appropriate in this visit:      /82   Ht 1.778 m (5' 10\")   Wt 103 kg (227 lb 3.2 oz)   LMP 09/17/2019 (Approximate)   BMI 32.60 kg/m²   HPI  Review of Systems   Constitutional: Negative.    Respiratory: Negative.    Cardiovascular: Negative.    Gastrointestinal: Negative.    Genitourinary: Negative.    Breast: Negative.   Neurological: Negative.      Physical Exam   Constitutional: She is oriented to person, place, and time. She appears well-developed and well-nourished.   Genitourinary: Vagina normal and uterus normal.   External female genitalia normal.   Vagina normal.   Cervix exam normal.  Uterus is normal.   Adnexa normal.   Neck: No thyromegaly present.   Pulmonary/Chest: Effort normal. Right breast exhibits no mass. Left breast exhibits no mass.   Abdominal: Soft.   Musculoskeletal: Normal range of motion.   Neurological: She is alert and oriented to person, place, and time.   Psychiatric: She has a normal mood and affect.       A/P: normal exam  Pap up to date   Discussed possibility of repeat retained products and accreta requiring hyster  Renewed OC's  Folate recommended for NTD protection        JESSICA Tyler      "

## 2019-11-05 RX ORDER — NORETHINDRONE ACETATE AND ETHINYL ESTRADIOL 1.5-30(21)
1 KIT ORAL DAILY
Qty: 84 TABLET | Refills: 3 | Status: SHIPPED | OUTPATIENT
Start: 2019-11-05 | End: 2020-01-21 | Stop reason: SDUPTHER

## 2019-11-05 NOTE — TELEPHONE ENCOUNTER
Medicine Refill Request    Last Office Visit: 10/1/2019  Next Office Visit: Visit date not found        Current Outpatient Medications:   •  norethindrone-e.estradiol-iron (JUNEL FE 1.5/30, 28,) 1.5 mg-30 mcg (21)/75 mg (7) per tablet, Take 1 tablet by mouth daily., Disp: 84 tablet, Rfl: 0      BP Readings from Last 3 Encounters:   10/01/19 126/82   09/25/19 122/82   01/10/19 120/80       Recent Lab results:  No results found for: CHOL, No results found for: HDL, No results found for: LDLCALC, No results found for: TRIG     Lab Results   Component Value Date    GLUCOSE 123 (H) 01/21/2018   ,   Lab Results   Component Value Date    HGBA1C 5.0 02/12/2018         Lab Results   Component Value Date    CREATININE 0.5 (L) 01/21/2018       Lab Results   Component Value Date    TSH 1.80 09/27/2018

## 2020-02-17 ENCOUNTER — OFFICE VISIT (OUTPATIENT)
Dept: FAMILY MEDICINE | Facility: CLINIC | Age: 35
End: 2020-02-17
Payer: COMMERCIAL

## 2020-02-17 VITALS
OXYGEN SATURATION: 99 % | TEMPERATURE: 98.2 F | RESPIRATION RATE: 17 BRPM | HEIGHT: 70 IN | SYSTOLIC BLOOD PRESSURE: 138 MMHG | WEIGHT: 223.6 LBS | HEART RATE: 75 BPM | BODY MASS INDEX: 32.01 KG/M2 | DIASTOLIC BLOOD PRESSURE: 90 MMHG

## 2020-02-17 DIAGNOSIS — R41.840 DIFFICULTY CONCENTRATING: Primary | ICD-10-CM

## 2020-02-17 PROCEDURE — 99213 OFFICE O/P EST LOW 20 MIN: CPT | Mod: GE | Performed by: STUDENT IN AN ORGANIZED HEALTH CARE EDUCATION/TRAINING PROGRAM

## 2020-02-17 NOTE — PROGRESS NOTES
"Subjective      Patient ID: Daya Pacheco is a 34 y.o. female here for the following:    ADD (Lack of Focus; wants to be screened for ADD)  Patient reports chronic issues with difficulty concentrating / finishing up projects - feels like she's \"given this lots of thought, in a fog until about 3pm - in fact they wanted to hold me back in  if that explains it. I feel like I can't focus to the extent of completing things. That, then, brings on a lot of anxiety and I become overwhelmed. I start things, i've noticed - for example - if i'm like cleaning, i'll start like 10 things and not finish them. The final straw is that I'm noticing that my 5 year old is a lot like I was when I was a kid, and it's sort of like a mirror.\"    Managed with caffeine, and at this point \"I feel like I'm drinking too much caffeine.\" Joined a gym 1 y/a, tried to exercise more, goes 3-4 times a week to see if that works.     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     Reports no difficulty with sleep - can fall asleep very well, goes to the gym from 9-10 at night. Gets 7 hours of sleep per night. Wakes up 1-2x/night with her one year old.     Review of systems  No fevers, chills, nausea/vomiting/diarrhea.  No abdominal pain. No constipation. No chest pain.  No shortness of breath.  No neurologic symptoms.  No focal weakness. No fatigue. No heat/cold intolerance.      The following have been reviewed and updated as appropriate in this visit:         Patient Active Problem List   Diagnosis   • Routine general medical examination at a health care facility   • Difficulty concentrating       Social History     Socioeconomic History   • Marital status:      Spouse name: Rowdy Pacheco   • Number of children: None   • Years of education: None   • Highest education level: None   Occupational History   • Occupation: Mental health therapist   Social Needs   • Financial resource strain: None   • Food " "insecurity:     Worry: None     Inability: None   • Transportation needs:     Medical: None     Non-medical: None   Tobacco Use   • Smoking status: Never Smoker   • Smokeless tobacco: Never Used   Substance and Sexual Activity   • Alcohol use: No   • Drug use: No   • Sexual activity: Yes     Partners: Male     Birth control/protection: OCP   Lifestyle   • Physical activity:     Days per week: None     Minutes per session: None   • Stress: None   Relationships   • Social connections:     Talks on phone: None     Gets together: None     Attends Holiness service: None     Active member of club or organization: None     Attends meetings of clubs or organizations: None     Relationship status: None   • Intimate partner violence:     Fear of current or ex partner: None     Emotionally abused: None     Physically abused: None     Forced sexual activity: None   Other Topics Concern   • None   Social History Narrative   • None       Allergies as of 02/17/2020 - Reviewed 02/17/2020   Allergen Reaction Noted   • No known allergies  10/28/2015         Current Outpatient Medications:   •  norethindrone-e.estradiol-iron (JUNEL FE 1.5/30, 28,) 1.5 mg-30 mcg (21)/75 mg (7) per tablet, Take 1 tablet by mouth daily., Disp: 84 tablet, Rfl: 2      Objective   Vitals:    02/17/20 0833   BP: 138/90   BP Location: Left upper arm   Patient Position: Sitting   Pulse: 75   Resp: 17   Temp: 36.8 °C (98.2 °F)   TempSrc: Oral   SpO2: 99%   Weight: 101 kg (223 lb 9.6 oz)   Height: 1.778 m (5' 10\")     Body mass index is 32.08 kg/m².    Physical Exam   Constitutional: She appears well-developed and well-nourished. No distress.   HENT:   Head: Normocephalic and atraumatic.   Right Ear: External ear normal.   Left Ear: External ear normal.   Eyes: Conjunctivae and EOM are normal. Right eye exhibits no discharge. Left eye exhibits no discharge.   Cardiovascular: Normal rate, regular rhythm and normal heart sounds.   No murmur " heard.  Pulmonary/Chest: Effort normal and breath sounds normal. She has no wheezes. She has no rales.   Abdominal: Soft. Bowel sounds are normal. She exhibits no distension. There is no guarding.   Obese.   Skin: She is not diaphoretic.   Vitals reviewed.      PHQ 2 to 9:  Have You Felt Down, Depressed or Hopeless?: no    Have You Felt Little Interest or Pleasure in Doing Things?: no    Little Interest or Pleasure in Doing Things: 0-->not at all    Feeling Down, Depressed or Hopeless: 0-->not at all    Trouble Falling or Staying Asleep, or Sleeping Too Much: 0-->not at all    Feeling Tired or Having Little Energy: 3-->nearly every day    Poor Appetite or Overeatin-->not at all    Feeling Bad about Yourself - or that You are a Failure or Have Let Yourself or Your Family Down: 2-->more than half the days    Trouble Concentrating on Things, Such as Reading the Newspaper or Watching Television: 3-->nearly every day    Moving or Speaking So Slowly that Other People Could Have Noticed? Or the Opposite - Being So Fidgety: 3-->nearly every day    Thoughts that You Would be Better Off Dead or of Hurting Yourself in Some Way: 0-->not at all      PHQ-9: Brief Depression Severity Measure Score: 11          Assessment/Plan   Diagnoses and all orders for this visit:    Difficulty concentrating (Primary)  Assessment & Plan:  Assessment/Plan:  · 34 y.o. female presenting with history and physical exam findings consistent with difficulty concentrating, which may be secondary to previously undiagnosed Adjustment Disorder, Major Depressive Disorder, ADHD Primarily Inattentive Type.  · Completed Adult ADHD Screening = borderline suggestive of ADHD  · Completed PHQ-9 - score = 11  · Referral information given for Adult Neuropsych Testing centers  · Referral information given for [facility].                  Sami Dejesus MD  2020

## 2020-02-17 NOTE — PATIENT INSTRUCTIONS
Diagnoses and all orders for this visit:    Difficulty concentrating (Primary)  Assessment & Plan:  Assessment/Plan:  · 34 y.o. female presenting with history and physical exam findings consistent with difficulty concentrating, which may be secondary to previously undiagnosed Adjustment Disorder, Major Depressive Disorder, ADHD Primarily Inattentive Type.  · Completed Adult ADHD Screening   · Completed PHQ-9  · Referral information given for Adult Neuropsych Testing centers  · Referral information given for [facility].    Primary Care Behavioral Health Model    At [facility] our Licensed Psychologist Dr. Valdez provides an integrated model of behavioral care.    The therapy Dr. Valdez provides consists of:  - 60 minute initial visits, 30 minute follow up visits every other week.  - She provides cognitive behavioral therapy orientation  - Combined visits with your Doctor and the psychologist (request when booking)    There is shared documentation between family physicians at the practice and the psychology team which builds a team approach to your care.    Benefits of this model:   Convenience   Less stigma involved   Team based approach   Whole body treatment approach    Negatives of the model:   More communication between doctors and psychologists (less confidential)   Less intensive (initial 60 minute visits then 30 minutes every 2 weeks thereafter)     Costs:   Medicaid - FREE   Commercial Insurance - Ranging from $20-60

## 2020-02-17 NOTE — ASSESSMENT & PLAN NOTE
Assessment/Plan:  · 34 y.o. female presenting with history and physical exam findings consistent with difficulty concentrating, which may be secondary to previously undiagnosed Adjustment Disorder, Major Depressive Disorder, ADHD Primarily Inattentive Type.  · Completed Adult ADHD Screening = borderline suggestive of ADHD  · Completed PHQ-9 - score = 11  · Referral information given for Adult Neuropsych Testing centers  · Referral information given for [facility].

## 2020-12-29 ENCOUNTER — IMMUNIZATION (OUTPATIENT)
Dept: IMMUNIZATION | Facility: CLINIC | Age: 35
End: 2020-12-29

## 2021-01-24 RX ORDER — NORETHINDRONE ACETATE AND ETHINYL ESTRADIOL 1.5-30(21)
1 KIT ORAL DAILY
Qty: 84 TABLET | Refills: 2 | Status: CANCELLED | OUTPATIENT
Start: 2021-01-24 | End: 2022-01-24

## 2021-01-26 ENCOUNTER — IMMUNIZATION (OUTPATIENT)
Dept: IMMUNIZATION | Facility: CLINIC | Age: 36
End: 2021-01-26

## 2021-03-31 ENCOUNTER — OFFICE VISIT (OUTPATIENT)
Dept: OBSTETRICS AND GYNECOLOGY | Facility: CLINIC | Age: 36
End: 2021-03-31
Payer: COMMERCIAL

## 2021-03-31 VITALS
HEIGHT: 70 IN | SYSTOLIC BLOOD PRESSURE: 130 MMHG | DIASTOLIC BLOOD PRESSURE: 80 MMHG | WEIGHT: 226.2 LBS | BODY MASS INDEX: 32.38 KG/M2

## 2021-03-31 DIAGNOSIS — Z01.419 WOMEN'S ANNUAL ROUTINE GYNECOLOGICAL EXAMINATION: Primary | ICD-10-CM

## 2021-03-31 DIAGNOSIS — R10.2 ACUTE PAIN IN FEMALE PELVIS: ICD-10-CM

## 2021-03-31 PROCEDURE — 99395 PREV VISIT EST AGE 18-39: CPT | Performed by: NURSE PRACTITIONER

## 2021-03-31 RX ORDER — DEXTROAMPHETAMINE SACCHARATE, AMPHETAMINE ASPARTATE, DEXTROAMPHETAMINE SULFATE AND AMPHETAMINE SULFATE 3.75; 3.75; 3.75; 3.75 MG/1; MG/1; MG/1; MG/1
TABLET ORAL
COMMUNITY
Start: 2021-03-25

## 2021-03-31 RX ORDER — NORETHINDRONE ACETATE AND ETHINYL ESTRADIOL 1.5-30(21)
1 KIT ORAL DAILY
Qty: 84 TABLET | Refills: 3 | Status: SHIPPED | OUTPATIENT
Start: 2021-03-31 | End: 2022-04-25 | Stop reason: SDUPTHER

## 2021-03-31 ASSESSMENT — ENCOUNTER SYMPTOMS
RESPIRATORY NEGATIVE: 1
NEUROLOGICAL NEGATIVE: 1
CONSTITUTIONAL NEGATIVE: 1
CARDIOVASCULAR NEGATIVE: 1

## 2021-03-31 NOTE — PROGRESS NOTES
3/31/2021  Daya Pacheco is a 35 y.o. female who presents for AV    Gyn History:    No LMP recorded.     Contraception: OC   Gardasil:   Menopause:   Incontinence: n/a  Pap: 2018: neg x 2   Mammo:   Dexa:   Colonoscopy:     PMH: none  PSH: D&C x 6 for retained POC, Tonsils and WT  FH: neg  Gyn hx: regular cycles.   Pap hx: Normal Pap hx  G 6 P   SA w no c/o    R sided pelvic superficial tenderness noted to touch (constant)    OB History:   OB History    Para Term  AB Living   6 2 2 0 4 2   SAB TAB Ectopic Multiple Live Births   4 0 0 0 2      # Outcome Date GA Lbr Miky/2nd Weight Sex Delivery Anes PTL Lv   6 Term 08/15/18 39w0d  4.67 kg (10 lb 4.7 oz) F CS-LTranv Spinal N IVON      Name: ESCOBAR,GIRL      Apgar1: 7  Apgar5: 9   5 SAB 2017     SAB         Birth Comments: chemical pregnancy   4 SAB 2017     SAB         Birth Comments: D&E for incomplete AB   3 Term 11/15/15 39w0d  5.33 kg (11 lb 12 oz) F CS-LTranv Spinal  IVON      Birth Comments: Postpartum hemorrhage, then retained placenta requiring D&C 2 wks post-op   2 2014 7w0d    SAB         Birth Comments: D&E for missed AB   1 2011     SAB         Birth Comments: D&E for blighted ovum         Medical History:   Past Medical History:   Diagnosis Date   • Anemia in pregnancy        Surgical History:   Past Surgical History:   Procedure Laterality Date   •  SECTION, LOW TRANSVERSE     •  SECTION, LOW TRANSVERSE     • D&C FIRST TRIMESTER / TX INCOMPLETE / MISSED / SEPTIC / INDUCED      • D&C FIRST TRIMESTER / TX INCOMPLETE / MISSED / SEPTIC / INDUCED      • D&C FIRST TRIMESTER / TX INCOMPLETE / MISSED / SEPTIC / INDUCED      • TONSILECTOMY, ADENOIDECTOMY, BILATERAL MYRINGOTOMY AND TUBES      no problems   • TONSILLECTOMY     • WISDOM TOOTH EXTRACTION         Allergies: No known allergies    Prior to Admission medications    Medication Sig Start Date End Date Taking?  Authorizing Provider   norethindrone-e.estradiol-iron (JUNEL FE 1.5/30, 28,) 1.5 mg-30 mcg (21)/75 mg (7) per tablet Take 1 tablet by mouth daily. 1/22/20 1/21/21  Elyse Mcdowell CRNP        Social History:   Social History     Socioeconomic History   • Marital status:      Spouse name: Rowdy Pacheco   • Number of children: Not on file   • Years of education: Not on file   • Highest education level: Not on file   Occupational History   • Occupation: Mental health therapist   Social Needs   • Financial resource strain: Not on file   • Food insecurity     Worry: Not on file     Inability: Not on file   • Transportation needs     Medical: Not on file     Non-medical: Not on file   Tobacco Use   • Smoking status: Never Smoker   • Smokeless tobacco: Never Used   Substance and Sexual Activity   • Alcohol use: No   • Drug use: No   • Sexual activity: Yes     Partners: Male     Birth control/protection: OCP   Lifestyle   • Physical activity     Days per week: Not on file     Minutes per session: Not on file   • Stress: Not on file   Relationships   • Social connections     Talks on phone: Not on file     Gets together: Not on file     Attends Spiritism service: Not on file     Active member of club or organization: Not on file     Attends meetings of clubs or organizations: Not on file     Relationship status: Not on file   • Intimate partner violence     Fear of current or ex partner: Not on file     Emotionally abused: Not on file     Physically abused: Not on file     Forced sexual activity: Not on file   Other Topics Concern   • Not on file   Social History Narrative   • Not on file        Family History:   Family History   Problem Relation Age of Onset   • No Known Problems Biological Mother    • No Known Problems Biological Father        Review of Systems and Physical Exam  The following have been reviewed and updated as appropriate in this visit:      There were no vitals taken for this visit.  HPI  Review of  Systems   Constitutional: Negative.    Respiratory: Negative.    Cardiovascular: Negative.    Genitourinary: Positive for pelvic pain.   Breast: Negative.   Neurological: Negative.      OBGyn Exam    A/P: Exam: tender to R palp  Suspect adhesions   Pap and HPV collected  Renewed OC's     JESSICA Tyler

## 2021-04-01 LAB
CLINICAL INFO: NORMAL
CYTO CVX: NORMAL
CYTOLOGIST CVX/VAG CYTO: NORMAL
CYTOLOGY CMNT CVX/VAG CYTO-IMP: NORMAL
DATE OF PREVIOUS PAP: NORMAL
DATE PREVIOUS BX: NORMAL
HPV E6+E7 MRNA CVX QL NAA+PROBE: NOT DETECTED
LMP START DATE: NORMAL
QUEST COMMENT (PAP): NORMAL
SPECIMEN SOURCE CVX/VAG CYTO: NORMAL
STAT OF ADQ CVX/VAG CYTO-IMP: NORMAL

## 2021-04-26 ENCOUNTER — HOSPITAL ENCOUNTER (OUTPATIENT)
Dept: RADIOLOGY | Age: 36
Discharge: HOME | End: 2021-04-26
Attending: NURSE PRACTITIONER
Payer: COMMERCIAL

## 2021-04-26 DIAGNOSIS — R10.2 ACUTE PAIN IN FEMALE PELVIS: ICD-10-CM

## 2021-04-26 PROCEDURE — 76830 TRANSVAGINAL US NON-OB: CPT

## 2021-04-26 PROCEDURE — 76856 US EXAM PELVIC COMPLETE: CPT

## 2021-04-27 ENCOUNTER — TELEPHONE (OUTPATIENT)
Dept: OBSTETRICS AND GYNECOLOGY | Facility: CLINIC | Age: 36
End: 2021-04-27

## 2021-04-27 NOTE — TELEPHONE ENCOUNTER
Spoke pt pt reviewed results of US. Recommended consult w Dr Mtz. Pt agreed to plan: trans to scheduling

## 2021-05-10 NOTE — PROGRESS NOTES
Visit Date: 2021   Daya Pacheco is 35 y.o. P2  female presenting today for consult regarding R ovarian mass 2.1 cm; possible dermoid. Pt here with her .     Pt reports pelvic discomfort since last C/S . Comes and goes; dull. Pushing on area elictis discomfort.  No sharp pain.  Does not really interfere with activities of daily living.    H/o C/S x 2.    Saw NP 3/31 for AV.      The following have been reviewed and updated as appropriate in this visit:      Visit Vitals  BP (!) 142/84   Wt 104 kg (230 lb)   LMP 05/10/2021 (Exact Date)   BMI 33.00 kg/m²     Menstrual History:  OB History        6    Para   2    Term   2       0    AB   4    Living   2       SAB   4    TAB   0    Ectopic   0    Multiple   0    Live Births   2                Patient's last menstrual period was 05/10/2021 (exact date).       Medications:   Current Outpatient Medications:   •  omeprazole 20 mg tablet, Take 20 mg by mouth daily before breakfast., Disp: , Rfl:   •  dextroamphetamine-amphetamine 15 mg tablet, , Disp: , Rfl:   •  norethindrone-e.estradioL-iron (JUNEL FE 1., ,) 1.5 mg-30 mcg (21)/75 mg (7) per tablet, Take 1 tablet by mouth daily., Disp: 84 tablet, Rfl: 3    Allergies: is allergic to no known allergies.     Past Medical History:  has a past medical history of Anemia in pregnancy.  Past Surgical History:  has a past surgical history that includes [place] tooth extraction; Tonsillectomy; D&C first trimester / Tx incomplete / missed / septic / induced  (); D&C first trimester / Tx incomplete / missed / septic / induced  (); D&C first trimester / Tx incomplete / missed / septic / induced  (); Tonsilectomy, adenoidectomy, bilateral myringotomy and tubes;  section, low transverse (); and  section, low transverse (2018).  Family History: family history includes No Known Problems in her biological father and biological mother.  Social  History:  reports that she has never smoked. She has never used smokeless tobacco. She reports that she does not drink alcohol and does not use drugs.  The patient is sexually active.    Physical Exam  Visit Vitals  BP (!) 142/84   Wt 104 kg (230 lb)   LMP 05/10/2021 (Exact Date)   BMI 33.00 kg/m²          General Appearance: Alert, cooperative, no acute distress  Head: Normocephalic, without obvious abnormality, atraumatic    Abdomen: Soft, nontender, nondistended,   no masses, no organomegaly; mild tenderness on deep palpation in the right lower quadrant.  No palpable mass below the Pfannenstiel skin incision.  No evidence of hernia with Valsalva.    Assessment & Plan  Dull right lower quadrant pain and possible incidental finding of a small right ovarian dermoid    Pelvic pain unlikely to be secondary to the dermoid because of a small size.  More likely she has some scar tissue from her previous C-sections.  This was discussed explained.    Ovarian dermoid was discussed.  It was explained that a tissue diagnosis is the only way to confirm 100%.  Ovarian cancer in this situation is extremely unlikely.  I recommend an MRI of the pelvis for further evaluation and to confirm the diagnosis.    The options of operative laparoscopy for evaluation of adhesions and possible cyst removal versus expectant management were discussed.  If the MRI is reassuring and the patient's symptoms remain mild, it certainly reasonable to manage conservatively with follow-up ultrasound.  Patient is comfortable with that plan after answering their questions.  I will contact her with the MRI results.    No follow-ups on file.  Tera Fong MD

## 2021-05-11 ENCOUNTER — TELEPHONE (OUTPATIENT)
Dept: OBSTETRICS AND GYNECOLOGY | Facility: CLINIC | Age: 36
End: 2021-05-11

## 2021-05-11 ENCOUNTER — CONSULT (OUTPATIENT)
Dept: OBSTETRICS AND GYNECOLOGY | Facility: CLINIC | Age: 36
End: 2021-05-11
Payer: COMMERCIAL

## 2021-05-11 VITALS — BODY MASS INDEX: 33 KG/M2 | DIASTOLIC BLOOD PRESSURE: 84 MMHG | SYSTOLIC BLOOD PRESSURE: 142 MMHG | WEIGHT: 230 LBS

## 2021-05-11 DIAGNOSIS — N94.89 ADNEXAL MASS: Primary | ICD-10-CM

## 2021-05-11 PROCEDURE — 99213 OFFICE O/P EST LOW 20 MIN: CPT | Performed by: OBSTETRICS & GYNECOLOGY

## 2021-05-11 PROCEDURE — 3008F BODY MASS INDEX DOCD: CPT | Performed by: OBSTETRICS & GYNECOLOGY

## 2021-05-11 RX ORDER — PHENOL/SODIUM PHENOLATE
20 AEROSOL, SPRAY (ML) MUCOUS MEMBRANE
COMMUNITY

## 2021-05-11 NOTE — TELEPHONE ENCOUNTER
Called [facility] for prior auth for MRI Pelvis. Spoke with Emma GARCIA Pelvic MRI [id number] test has been approved Auth # [id number] valid 5/11/2021-11/7/2021 for [facility]. Patient notified and will schedule testing.

## 2021-05-26 ENCOUNTER — HOSPITAL ENCOUNTER (OUTPATIENT)
Dept: RADIOLOGY | Facility: HOSPITAL | Age: 36
Discharge: HOME | End: 2021-05-26
Attending: OBSTETRICS & GYNECOLOGY
Payer: COMMERCIAL

## 2021-05-26 DIAGNOSIS — N94.89 ADNEXAL MASS: ICD-10-CM

## 2021-05-26 PROCEDURE — A9585 GADOBUTROL INJECTION: HCPCS | Performed by: OBSTETRICS & GYNECOLOGY

## 2021-05-26 PROCEDURE — G1004 CDSM NDSC: HCPCS

## 2021-05-26 RX ORDER — GADOBUTROL 604.72 MG/ML
0.1 INJECTION INTRAVENOUS ONCE
Status: COMPLETED | OUTPATIENT
Start: 2021-05-26 | End: 2021-05-26

## 2021-05-26 RX ADMIN — GADOBUTROL 9.5 MMOL: 604.72 INJECTION INTRAVENOUS at 18:40

## 2021-06-12 NOTE — PROGRESS NOTES
Here us ultrasound. Thanks. [facility]   Transplant Nephrology Progress Note  Date of Admission:  6/10/2021  Today's Date: 06/12/2021    Recommendations:  - No new recommendations at this time.    Assessment & Plan   # DDKTX (SPK) Last checked at time of transplant              - Baseline Creatinine: ~ TBD              - Proteinuria: Not checked post transplant              - Date DSA Last Checked: Jun/2021      Latest DSA: No DSA at time of transplant              - BK Viremia: No              - Kidney Tx Biopsy: No     # Pancreas Tx (SPK):               - Pancreatic Exocrine Drainage: Enteric drained                     - Blood glucose: hyperglycemia       On insulin: yes. Restarted               - HbA1c: Last checked at time of transplant                   - Pancreatic enzymes: Last checked at time of transplant              - Date DSA Last Checked: Jun/2021             Latest DSA: No DSA at time of transplant              - Pancreas Tx Biopsy: No     # Immunosuppression: Tacrolimus immediate release (goal 8-10), Mycophenolate mofetil (dose 1000 mg every 12 hours) and Prednisone (dose taper)               - Induction: intermediate risk protocol, PRA 32%              - Changes: No     # Infection Prophylaxis:   - PJP: Sulfa/TMP (Bactrim)  - CMV: Valcyte  - Thrush: Micafungin (Mycamine)    # Hypertension: Inadequate control;  Goal BP: < 150/90   - Volume status: Euvolemic    - Changes: Not at this time    # Anemia in Chronic Renal Disease: Hgb: Stable      ROSA M: No   - Iron studies: Unknown at this time, but checked with dialysis    # Mineral Bone Disorder:   - Secondary renal hyperparathyroidism; PTH level: Unknown at this time, but checked with dialysis        On treatment: None  - Vitamin D; level: Unknown at this time, but checked with dialysis        On supplement: No  - Calcium; level: Low        On supplement: No  - Phosphorus; level: Normal        On supplement: No    # Electrolytes:   -  Potassium; level: Normal        On supplement: No  - Magnesium; level: Normal        On supplement: Yes  - Bicarbonate; level: Normal        On supplement: No  - Sodium; level: Normal    # Transplant History:  Etiology of Kidney Failure: Diabetes mellitus type 2  Tx: DDKT (SPK)  Transplant: 6/11/2021 (Kidney / Pancreas)  DSA at time of Tx: No  Significant changes in immunosuppression: None  Significant transplant-related complications: None     Recommendations were communicated to the primary team verbally.  Seen and discussed with Dr. Sunday Vincent, NP   Pager: [phone]    Interval History   Fatigued, but doing well overall.     No chest pain or shortness of breath    Review of Systems   4 point ROS was obtained and negative except as noted in the Interval History.    MEDICATIONS:    acetaminophen  975 mg Oral Q8H     amLODIPine  10 mg Oral or NG Tube Daily     aspirin  81 mg Oral Daily     [START ON 6/13/2021] atorvastatin  40 mg Oral Daily     [START ON 6/16/2021] basiliximab (SIMULECT) infusion  20 mg Intravenous Once     [START ON 6/13/2021] calcium carbonate-vitamin D  1 tablet Oral BID w/meals     cloNIDine  0.2 mg Oral BID     clotrimazole  10 mg Buccal 4x Daily     docusate sodium  100 mg Oral BID     [START ON 6/13/2021] magnesium oxide  400 mg Oral Q24H     [START ON 6/13/2021] methylPREDNISolone  100 mg Intravenous Once     micafungin  100 mg Intravenous Q24H     mycophenolate  1,000 mg Per NG tube BID     octreotide  100 mcg Subcutaneous Q12H     pantoprazole  40 mg Per NG tube Daily     piperacillin-tazobactam  2.25 g Intravenous Q8H     polyethylene glycol  17 g Oral Daily     [START ON 6/14/2021] predniSONE  60 mg Oral Daily    Followed by     [START ON 6/16/2021] predniSONE  40 mg Oral Daily    Followed by     [START ON 6/18/2021] predniSONE  20 mg Oral Daily    Followed by     [START ON 6/25/2021] predniSONE  15 mg Oral Daily    Followed by     [START ON 7/2/2021] predniSONE  10 mg Oral  "Daily    Followed by     [START ON 2021] predniSONE  5 mg Oral Daily     senna-docusate  1 tablet Oral BID     sodium chloride (PF)  3 mL Intravenous Q8H     [START ON 2021] sulfamethoxazole-trimethoprim  10 mL Per NG tube Q Mon Wed Fri AM     tacrolimus  2 mg Oral BID IS     [START ON 2021] valGANciclovir  450 mg Oral Once per day on Mon Thu       dextrose       IV fluid REPLACEMENT ONLY 100 mL/hr at 21 1148     heparin 400 Units/hr (21 1100)     insulin regular 3 Units/hr (21 1606)       Physical Exam   Temp  Av.5  F (36.9  C)  Min: 98.2  F (36.8  C)  Max: 98.9  F (37.2  C)  Arterial Line BP  Min: 216/75  Max: 221/84  Arterial Line MAP (mmHg)  Av mmHg  Min: 124 mmHg  Max: 137 mmHg      Pulse  Av.9  Min: 63  Max: 112 Resp  Av.6  Min: 12  Max: 24  SpO2  Av.7 %  Min: 89 %  Max: 100 %    CVP (mmHg): 3 mmHgBP (!) 199/93   Pulse 89   Temp 98.2  F (36.8  C) (Oral)   Resp 16   Ht 1.651 m (5' 5\")   Wt 91 kg (200 lb 9.9 oz)   SpO2 95%   BMI 33.38 kg/m     Date 21 0700 - 21 0659   Shift 7379-3100 6045-6965 7874-4711 24 Hour Total   INTAKE   I.V. 2366.94   2366.94   NG/   120   Shift Total(mL/kg) 2486.94(27.33)   2486.94(27.33)   OUTPUT   Urine 1450   1450   Emesis/NG output 325   325   Drains 210   210   Shift Total(mL/kg) (21.81)   (21.81)   Weight (kg) 91 91 91 91      Admit Weight: 87.2 kg (192 lb 3.9 oz)     GENERAL APPEARANCE: alert and no distress  HENT: mouth without ulcers or lesions  RESP: lungs clear to auscultation - no rales, rhonchi or wheezes  CV: regular rhythm, normal rate, no rub, no murmur  EDEMA: no LE edema bilaterally  ABDOMEN: soft, nondistended, nontender, bowel sounds normal  MS: extremities normal - no gross deformities noted, no evidence of inflammation in joints, no muscle tenderness  SKIN: no rash    Data   All labs reviewed by me.  CMP  Recent Labs   Lab 21  0624 21  0057 21  1948 " 06/11/21  1603 06/11/21  0540 06/11/21  0540 06/11/21  0100 06/11/21  0000 06/10/21  0927 06/10/21  0927     --   --   --   --  132* 132* 133   < > 132*   POTASSIUM 4.0 4.2 4.1 4.4   < > 4.8 4.1 4.0   < > 4.3   CHLORIDE 108  --   --   --   --  100  --   --   --  96   CO2 26  --   --   --   --  19*  --   --   --  29   ANIONGAP 4  --   --   --   --  12  --   --   --  7   *  --   --   --   --  129* 134* 142*   < > 119*   BUN 41*  --   --   --   --  42*  --   --   --  34*   CR 6.08*  --   --   --   --  7.93*  --   --   --  7.32*   GFRESTIMATED 11*  --   --   --   --  8*  --   --   --  9*   GFRESTBLACK 12*  --   --   --   --  9*  --   --   --  10*   SHAY 7.9*  --   --   --   --  9.0  --   --   --  8.8   MAG 1.9  --   --   --   --  1.5*  --   --   --   --    PHOS 3.8  --   --   --   --  2.2*  --   --   --   --    PROTTOTAL  --   --   --   --   --   --   --   --   --  7.2   ALBUMIN  --   --   --   --   --   --   --   --   --  3.9   BILITOTAL  --   --   --   --   --   --   --   --   --  0.4   ALKPHOS  --   --   --   --   --   --   --   --   --  84   AST  --   --   --   --   --   --   --   --   --  32   ALT  --   --   --   --   --   --   --   --   --  32    < > = values in this interval not displayed.     CBC  Recent Labs   Lab 06/12/21  0624 06/12/21  0057 06/11/21 1948 06/11/21  1603 06/11/21  0540 06/11/21  0540 06/11/21  0423 06/10/21  0927 06/10/21  0927   HGB 9.0* 9.1* 9.5* 9.9*   < > 8.7* 6.6*   < > 8.1*   WBC 9.5  --   --   --   --  14.2* 9.8  --  6.7   RBC 2.96*  --   --   --   --  2.84* 2.16*  --  2.61*   HCT 28.0*  --   --   --   --  26.4* 20.3*  --  25.0*   MCV 95  --   --   --   --  93 94  --  96   MCH 30.4  --   --   --   --  30.6 30.6  --  31.0   MCHC 32.1  --   --   --   --  33.0 32.5  --  32.4   RDW 17.0*  --   --   --   --  16.7* 16.9*  --  13.4     --   --   --   --  217 169  --  231    < > = values in this interval not displayed.     INR  Recent Labs   Lab 06/11/21  0540 06/10/21  0927    INR 1.09 0.98   PTT 34 37     ABG  Recent Labs   Lab 06/11/21  0100 06/11/21  0000 06/10/21  2300 06/10/21  2152   PH 7.40 7.42 7.43 7.45   PCO2 35 35 34* 34*   PO2 121* 128* 177* 113*   HCO3 21 23 23 24   O2PER 40% 40% 40.0 40      Urine Studies  Recent Labs   Lab Test 06/10/21  1120 07/29/19  1240 07/18/19  1200   COLOR Light Yellow Straw Yellow   APPEARANCE Clear Clear Clear   URINEGLC Negative 50* 50*   URINEBILI Negative Negative Negative   URINEKETONE Negative Negative Negative   SG 1.007 1.010 1.011   UBLD Negative Negative Negative   URINEPH 7.0 5.0 6.0   PROTEIN 200* >499* >499*   NITRITE Negative Negative Negative   LEUKEST Negative Negative Negative   RBCU 3* 5* 9*   WBCU 1 3 5     Recent Labs   Lab Test 06/10/21  1120 08/31/20  0000 07/18/19  1200   UTPG 5.90* 2.62* 6.20*     PTH  No lab results found.  Iron Studies  No lab results found.    IMAGING:  All imaging studies reviewed by me.    Patient was seen by myself, Dr. Bertin Brand, in conjunction with Leilani Vincent NP as part of a shared visit.    I personally reviewed past medical and surgical history, vital signs, medications and labs.  Present and past medical history, along with significant physical exam findings were all reviewed with ADI.    My key findings:  SPK    Key management decisions made by me and discussed with ADI:  Progressing well, interval increase in insulin requirement, will monitor

## 2021-11-05 ENCOUNTER — HOSPITAL ENCOUNTER (OUTPATIENT)
Facility: CLINIC | Age: 36
Discharge: HOME | End: 2021-11-05
Attending: FAMILY MEDICINE
Payer: COMMERCIAL

## 2021-11-05 VITALS
TEMPERATURE: 98.1 F | HEART RATE: 84 BPM | RESPIRATION RATE: 18 BRPM | OXYGEN SATURATION: 99 % | SYSTOLIC BLOOD PRESSURE: 120 MMHG | DIASTOLIC BLOOD PRESSURE: 72 MMHG

## 2021-11-05 DIAGNOSIS — J06.9 VIRAL UPPER RESPIRATORY TRACT INFECTION: Primary | ICD-10-CM

## 2021-11-05 LAB
EXPIRATION DATE: NORMAL
Lab: NORMAL
POCT MANUFACTURER: NORMAL
S PYO AG THROAT QL: NEGATIVE
SARS-COV-2 RNA RESP QL NAA+PROBE: NEGATIVE

## 2021-11-05 PROCEDURE — U0003 INFECTIOUS AGENT DETECTION BY NUCLEIC ACID (DNA OR RNA); SEVERE ACUTE RESPIRATORY SYNDROME CORONAVIRUS 2 (SARS-COV-2) (CORONAVIRUS DISEASE [COVID-19]), AMPLIFIED PROBE TECHNIQUE, MAKING USE OF HIGH THROUGHPUT TECHNOLOGIES AS DESCRIBED BY CMS-2020-01-R: HCPCS | Performed by: NURSE PRACTITIONER

## 2021-11-05 PROCEDURE — S9083 URGENT CARE CENTER GLOBAL: HCPCS | Performed by: NURSE PRACTITIONER

## 2021-11-05 PROCEDURE — 87880 STREP A ASSAY W/OPTIC: CPT | Performed by: NURSE PRACTITIONER

## 2021-11-05 PROCEDURE — 99213 OFFICE O/P EST LOW 20 MIN: CPT | Performed by: NURSE PRACTITIONER

## 2021-11-05 RX ORDER — FLUTICASONE PROPIONATE 50 MCG
2 SPRAY, SUSPENSION (ML) NASAL DAILY
Qty: 16 G | Refills: 0 | Status: SHIPPED | OUTPATIENT
Start: 2021-11-05

## 2021-11-05 ASSESSMENT — ENCOUNTER SYMPTOMS
DYSURIA: 0
BACK PAIN: 0
COLOR CHANGE: 0
ARTHRALGIAS: 0
SEIZURES: 0
CHILLS: 1
COUGH: 1
EYES NEGATIVE: 1
ENDOCRINE NEGATIVE: 1
FEVER: 0
SHORTNESS OF BREATH: 0
SORE THROAT: 1
ABDOMINAL PAIN: 0
PSYCHIATRIC NEGATIVE: 1
PALPITATIONS: 0
VOMITING: 0
EYE PAIN: 0
HEMATOLOGIC/LYMPHATIC NEGATIVE: 1
HEMATURIA: 0
CARDIOVASCULAR NEGATIVE: 1
HEADACHES: 1

## 2021-11-05 NOTE — DISCHARGE INSTRUCTIONS
Use the prescribed Flonase 2 sprays in each nostril daily.  You can also try the NeilMed sinus rinses which can be purchased over-the-counter.  Mucinex DM could also help with your cough and congestion.  Make sure you take with full glass of water and stay well-hydrated throughout the day.

## 2021-11-05 NOTE — ED PROVIDER NOTES
Emergency Medicine Note  HPI   HISTORY OF PRESENT ILLNESS     Patient presents with sore throat x 5 days.   Also c/o nasal congestion and headache.   She reports that she has a 3 year old at home with cold.   Tried Theraflu, sudafed and Motrin with mild relief.  Rapid COVID negative yesterday.   Fully vaccinated for COVID.             Patient History   PAST HISTORY     Reviewed from Nursing Triage:      Past Medical History:   Diagnosis Date   • Anemia in pregnancy        Past Surgical History:   Procedure Laterality Date   •  SECTION, LOW TRANSVERSE     •  SECTION, LOW TRANSVERSE     • D&C FIRST TRIMESTER / TX INCOMPLETE / MISSED / SEPTIC / INDUCED      • D&C FIRST TRIMESTER / TX INCOMPLETE / MISSED / SEPTIC / INDUCED      • D&C FIRST TRIMESTER / TX INCOMPLETE / MISSED / SEPTIC / INDUCED      • TONSILECTOMY, ADENOIDECTOMY, BILATERAL MYRINGOTOMY AND TUBES      no problems   • TONSILLECTOMY     • WISDOM TOOTH EXTRACTION         Family History   Problem Relation Age of Onset   • No Known Problems Biological Mother    • No Known Problems Biological Father        Social History     Tobacco Use   • Smoking status: Never Smoker   • Smokeless tobacco: Never Used   Substance Use Topics   • Alcohol use: No   • Drug use: No         Review of Systems   REVIEW OF SYSTEMS     Review of Systems   Constitutional: Positive for chills. Negative for fever.   HENT: Positive for congestion and sore throat. Negative for ear pain.    Eyes: Negative.  Negative for pain and visual disturbance.   Respiratory: Positive for cough. Negative for shortness of breath.    Cardiovascular: Negative.  Negative for chest pain and palpitations.   Gastrointestinal: Negative for abdominal pain and vomiting.   Endocrine: Negative.    Genitourinary: Negative.  Negative for dysuria and hematuria.   Musculoskeletal: Negative for arthralgias and back pain.   Skin: Negative.  Negative for color change  and rash.   Neurological: Positive for headaches. Negative for seizures and syncope.   Hematological: Negative.    Psychiatric/Behavioral: Negative.    All other systems reviewed and are negative.        VITALS     ED Vitals    Date/Time Temp Pulse Resp BP SpO2 [facility]   11/05/21 0945 36.7 °C (98.1 °F) 84 18 120/72 99 % DS                       Physical Exam   PHYSICAL EXAM     Physical Exam  Vitals reviewed.   Constitutional:       General: She is not in acute distress.     Appearance: Normal appearance. She is not ill-appearing, toxic-appearing or diaphoretic.   HENT:      Head: Normocephalic and atraumatic.      Right Ear: Tympanic membrane normal.      Left Ear: Tympanic membrane normal.      Mouth/Throat:      Mouth: Mucous membranes are moist.      Pharynx: Oropharynx is clear. Uvula midline. Posterior oropharyngeal erythema present. No pharyngeal swelling or oropharyngeal exudate.      Tonsils: 0 on the right. 0 on the left.      Comments: Tonsils absent  Cardiovascular:      Rate and Rhythm: Normal rate.      Heart sounds: Normal heart sounds.   Pulmonary:      Effort: Pulmonary effort is normal. No respiratory distress.      Breath sounds: Normal breath sounds. No stridor. No wheezing, rhonchi or rales.   Musculoskeletal:         General: Normal range of motion.      Cervical back: Normal range of motion. No rigidity.   Lymphadenopathy:      Cervical: Cervical adenopathy present.   Skin:     General: Skin is warm and dry.   Neurological:      General: No focal deficit present.      Mental Status: She is alert and oriented to person, place, and time.   Psychiatric:         Mood and Affect: Mood normal.         Behavior: Behavior normal.           PROCEDURES     Procedures     DATA     Results     None              No orders to display       Scoring tools                                 ED Course & MDM   MDM / ED COURSE and CLINICAL IMPRESSIONS     MDM  Number of Diagnoses or Management Options  Viral upper  respiratory tract infection  Diagnosis management comments: Rapid strep was negative.   Sent Covid swab, will call with results.    Sent a prescription for Flonase to requested pharmacy.  Recommended sinus rinses and other over-the-counter supportive care as needed.  Instructed to follow up with PCP or ED for new or worsening symptoms.           Clinical Impressions as of 11/05/21 1036   Viral upper respiratory tract infection            Carla Jarquin CRNP  11/05/21 3845

## 2021-11-06 NOTE — RESULT ENCOUNTER NOTE
Your COVID test result came back negative meaning you do not have Coronavirus. If you have any questions about this please call [phone].

## 2021-12-14 ENCOUNTER — IMMUNIZATION (OUTPATIENT)
Dept: IMMUNIZATION | Facility: CLINIC | Age: 36
End: 2021-12-14
Payer: COMMERCIAL

## 2021-12-14 PROCEDURE — 91306 SARS-COV-2 VACCINE BOOSTER MODERNA: CPT | Performed by: FAMILY MEDICINE

## 2021-12-14 PROCEDURE — 0064A SARS-COV-2 VACCINE BOOSTER MODERNA: CPT | Performed by: FAMILY MEDICINE

## 2022-04-25 RX ORDER — NORETHINDRONE ACETATE AND ETHINYL ESTRADIOL 1.5-30(21)
1 KIT ORAL DAILY
Qty: 84 TABLET | Refills: 3 | OUTPATIENT
Start: 2022-04-25 | End: 2023-04-25

## 2022-04-25 RX ORDER — NORETHINDRONE ACETATE AND ETHINYL ESTRADIOL 1.5-30(21)
1 KIT ORAL DAILY
Qty: 84 TABLET | Refills: 0 | Status: SHIPPED | OUTPATIENT
Start: 2022-04-25 | End: 2022-07-18

## 2022-04-25 NOTE — TELEPHONE ENCOUNTER
Medicine Refill Request    Last Office: 3/31/2021   Last Consult Visit: 5/11/2021  Last Telemedicine Visit: Visit date not found    Next Appointment: Visit date not found      Current Outpatient Medications:   •  dextroamphetamine-amphetamine 15 mg tablet, , Disp: , Rfl:   •  fluticasone propionate 50 mcg/actuation nasal spray, Administer 2 sprays into each nostril daily., Disp: 16 g, Rfl: 0  •  norethindrone-e.estradioL-iron (JUNEL FE 1.5/30, 28,) 1.5 mg-30 mcg (21)/75 mg (7) per tablet, Take 1 tablet by mouth daily., Disp: 84 tablet, Rfl: 3  •  omeprazole 20 mg tablet, Take 20 mg by mouth daily before breakfast., Disp: , Rfl:       BP Readings from Last 3 Encounters:   11/05/21 120/72   05/11/21 (!) 142/84   03/31/21 130/80       Recent Lab results:  No results found for: CHOL, No results found for: HDL, No results found for: LDLCALC, No results found for: TRIG     Lab Results   Component Value Date    GLUCOSE 123 (H) 01/21/2018   ,   Lab Results   Component Value Date    HGBA1C 5.0 02/12/2018         Lab Results   Component Value Date    CREATININE 0.5 (L) 01/21/2018       Lab Results   Component Value Date    TSH 1.80 09/27/2018

## 2022-07-18 RX ORDER — NORETHINDRONE ACETATE AND ETHINYL ESTRADIOL, AND FERROUS FUMARATE 1.5-30(21)
KIT ORAL
Qty: 84 TABLET | Refills: 0 | Status: SHIPPED | OUTPATIENT
Start: 2022-07-18 | End: 2022-10-21

## 2022-07-18 NOTE — TELEPHONE ENCOUNTER
Medicine Refill Request    Last Office: 3/31/2021   Last Consult Visit: 5/11/2021  Last Telemedicine Visit: Visit date not found    Next Appointment: 8/8/2022      Current Outpatient Medications:   •  dextroamphetamine-amphetamine 15 mg tablet, , Disp: , Rfl:   •  fluticasone propionate 50 mcg/actuation nasal spray, Administer 2 sprays into each nostril daily., Disp: 16 g, Rfl: 0  •  norethindrone-e.estradioL-iron (JUNEL FE 1.5/30, 28,) 1.5 mg-30 mcg (21)/75 mg (7) per tablet, Take 1 tablet by mouth daily., Disp: 84 tablet, Rfl: 0  •  omeprazole 20 mg tablet, Take 20 mg by mouth daily before breakfast., Disp: , Rfl:       BP Readings from Last 3 Encounters:   11/05/21 120/72   05/11/21 (!) 142/84   03/31/21 130/80       Recent Lab results:  No results found for: CHOL, No results found for: HDL, No results found for: LDLCALC, No results found for: TRIG     Lab Results   Component Value Date    GLUCOSE 123 (H) 01/21/2018   ,   Lab Results   Component Value Date    HGBA1C 5.0 02/12/2018         Lab Results   Component Value Date    CREATININE 0.5 (L) 01/21/2018       Lab Results   Component Value Date    TSH 1.80 09/27/2018

## 2022-10-26 ENCOUNTER — HOSPITAL ENCOUNTER (OUTPATIENT)
Facility: CLINIC | Age: 37
Discharge: LEFT BEFORE DISCHARGE | End: 2022-10-26
Payer: COMMERCIAL

## 2022-12-01 NOTE — PROGRESS NOTES
2022  Daya Pacheco is a 37 y.o. female who presents for AV    Gyn History:    No LMP recorded.     Contraception:   Gardasil:   Menopause:   Incontinence: n/a  Pap: 3/2021: neg x 2   Mammo:   Dexa:   Colonoscopy:     PMH: none  PSH: D&C x 6 for retained POC, Tonsils and WT  FH: neg  Gyn hx: regular cycles.   Dx w small dermoid 2021: MRI/[facility]   OC user: approx 10   Pap hx: Normal Pap hx  Gardasil:   G 6 P   SA w no c/o    Had pain : MRI dx w dermoid       OB History:   OB History    Para Term  AB Living   6 2 2 0 4 2   SAB IAB Ectopic Multiple Live Births   4 0 0 0 2      # Outcome Date GA Lbr Miky/2nd Weight Sex Delivery Anes PTL Lv   6 Term 08/15/18 39w0d  4.67 kg (10 lb 4.7 oz) F CS-LTranv Spinal N IVON      Name: ESCOBAR,GIRL      Apgar1: 7  Apgar5: 9   5 SAB 2017     SAB         Birth Comments: chemical pregnancy   4 SAB 2017     SAB         Birth Comments: D&E for incomplete AB   3 Term 11/15/15 39w0d  5.33 kg (11 lb 12 oz) F CS-LTranv Spinal  IVON      Birth Comments: Postpartum hemorrhage, then retained placenta requiring D&C 2 wks post-op   2 2014 7w0d    SAB         Birth Comments: D&E for missed AB   1 2011     SAB         Birth Comments: D&E for blighted ovum         Medical History:   Past Medical History:   Diagnosis Date   • Anemia in pregnancy        Surgical History:   Past Surgical History:   Procedure Laterality Date   •  SECTION, LOW TRANSVERSE     •  SECTION, LOW TRANSVERSE     • D&C FIRST TRIMESTER / TX INCOMPLETE / MISSED / SEPTIC / INDUCED      • D&C FIRST TRIMESTER / TX INCOMPLETE / MISSED / SEPTIC / INDUCED      • D&C FIRST TRIMESTER / TX INCOMPLETE / MISSED / SEPTIC / INDUCED      • TONSILECTOMY, ADENOIDECTOMY, BILATERAL MYRINGOTOMY AND TUBES      no problems   • TONSILLECTOMY     • WISDOM TOOTH EXTRACTION         Allergies: No known allergies    Prior to Admission medications     Medication Sig Start Date End Date Taking? Authorizing Provider   dextroamphetamine-amphetamine 15 mg tablet  3/25/21   ProviderKaterina MD   fluticasone propionate 50 mcg/actuation nasal spray Administer 2 sprays into each nostril daily. 11/5/21   Carla Jarquin CRNP   JUNEL FE 1.5/30, 28, 1.5 mg-30 mcg (21)/75 mg (7) per tablet TAKE 1 TABLET BY MOUTH EVERY DAY 10/21/22   Barbara Starr CRNP   omeprazole 20 mg tablet Take 20 mg by mouth daily before breakfast.    ProviderKaterina MD        Social History:   Social History     Socioeconomic History   • Marital status:      Spouse name: Rowdy Pacheco   Occupational History   • Occupation: Mental health therapist   Tobacco Use   • Smoking status: Never   • Smokeless tobacco: Never   Substance and Sexual Activity   • Alcohol use: No   • Drug use: No   • Sexual activity: Yes     Partners: Male     Birth control/protection: OCP        Family History:   Family History   Problem Relation Age of Onset   • No Known Problems Biological Mother    • No Known Problems Biological Father        Review of Systems and Physical Exam  The following have been reviewed and updated as appropriate in this visit:       There were no vitals taken for this visit.  HPI  Review of Systems   Constitutional: Negative.    Respiratory: Negative.    Cardiovascular: Negative.    Gastrointestinal: Negative.    Genitourinary: Negative.    Breast: Negative.   Neurological: Negative.      Physical Exam  Constitutional:       Appearance: She is well-developed and well-nourished.   Genitourinary:      Vagina, cervix and uterus normal.     External female genitalia normal.   Vagina normal.   Cervix exam normal.  Uterus is normal.   Adnexa normal. Neck:      Thyroid: No thyromegaly.   Pulmonary:      Effort: Pulmonary effort is normal.   Chest:   Breasts:     Right: No mass.      Left: No mass.   Abdominal:      Palpations: Abdomen is soft.   Musculoskeletal:         General:  Normal range of motion.   Neurological:      Mental Status: She is alert and oriented to person, place, and time.   Psychiatric:         Mood and Affect: Mood and affect normal.         A/P: Normal exam  Pap: up to date  Renewed OC  Encouraged exercise   Encouraged JESSICA Rosario

## 2022-12-05 ENCOUNTER — OFFICE VISIT (OUTPATIENT)
Dept: OBSTETRICS AND GYNECOLOGY | Facility: CLINIC | Age: 37
End: 2022-12-05
Payer: COMMERCIAL

## 2022-12-05 VITALS
SYSTOLIC BLOOD PRESSURE: 118 MMHG | WEIGHT: 201.6 LBS | DIASTOLIC BLOOD PRESSURE: 72 MMHG | BODY MASS INDEX: 28.86 KG/M2 | HEIGHT: 70 IN

## 2022-12-05 DIAGNOSIS — Z01.419 WOMEN'S ANNUAL ROUTINE GYNECOLOGICAL EXAMINATION: Primary | ICD-10-CM

## 2022-12-05 PROCEDURE — 99395 PREV VISIT EST AGE 18-39: CPT | Performed by: NURSE PRACTITIONER

## 2022-12-05 PROCEDURE — 3008F BODY MASS INDEX DOCD: CPT | Performed by: NURSE PRACTITIONER

## 2022-12-05 RX ORDER — NORETHINDRONE ACETATE AND ETHINYL ESTRADIOL 1.5-30(21)
1 KIT ORAL DAILY
Qty: 84 TABLET | Refills: 3 | Status: SHIPPED | OUTPATIENT
Start: 2022-12-05 | End: 2025-03-10 | Stop reason: ALTCHOICE

## 2022-12-05 ASSESSMENT — ENCOUNTER SYMPTOMS
CONSTITUTIONAL NEGATIVE: 1
CARDIOVASCULAR NEGATIVE: 1
GASTROINTESTINAL NEGATIVE: 1
NEUROLOGICAL NEGATIVE: 1
RESPIRATORY NEGATIVE: 1

## 2023-02-19 RX ORDER — NORETHINDRONE ACETATE AND ETHINYL ESTRADIOL 1.5-30(21)
1 KIT ORAL DAILY
Qty: 84 TABLET | Refills: 3 | Status: CANCELLED | OUTPATIENT
Start: 2023-02-19

## 2023-02-20 NOTE — TELEPHONE ENCOUNTER
Medicine Refill Request    Last Office: 12/5/2022   Last Consult Visit: 5/11/2021  Last Telemedicine Visit: Visit date not found    Next Appointment: Visit date not found      Current Outpatient Medications:   •  dextroamphetamine-amphetamine 15 mg tablet, , Disp: , Rfl:   •  fluticasone propionate 50 mcg/actuation nasal spray, Administer 2 sprays into each nostril daily. (Patient not taking: Reported on 12/5/2022), Disp: 16 g, Rfl: 0  •  norethindrone-e.estradioL-iron (JUNEL FE 1.5/30, 28,) 1.5 mg-30 mcg (21)/75 mg (7) per tablet, Take 1 tablet by mouth daily., Disp: 84 tablet, Rfl: 3  •  omeprazole 20 mg tablet, Take 20 mg by mouth daily before breakfast., Disp: , Rfl:       BP Readings from Last 3 Encounters:   12/05/22 118/72   11/05/21 120/72   05/11/21 (!) 142/84       Recent Lab results:  No results found for: CHOL, No results found for: HDL, No results found for: LDLCALC, No results found for: TRIG     Lab Results   Component Value Date    GLUCOSE 123 (H) 01/21/2018   ,   Lab Results   Component Value Date    HGBA1C 5.0 02/12/2018         Lab Results   Component Value Date    CREATININE 0.5 (L) 01/21/2018       Lab Results   Component Value Date    TSH 1.80 09/27/2018

## 2023-04-29 ENCOUNTER — TELEPHONE (OUTPATIENT)
Dept: OBSTETRICS AND GYNECOLOGY | Facility: CLINIC | Age: 38
End: 2023-04-29
Payer: COMMERCIAL

## 2023-04-29 RX ORDER — SULFAMETHOXAZOLE AND TRIMETHOPRIM 800; 160 MG/1; MG/1
1 TABLET ORAL 2 TIMES DAILY
Qty: 6 TABLET | Refills: 0 | Status: SHIPPED | OUTPATIENT
Start: 2023-04-29 | End: 2023-05-02

## 2023-04-29 NOTE — TELEPHONE ENCOUNTER
Pt called with urinary frequency, urgency and dysuria. No hematuria, fever or chills. Has had a UTI in past and this feels similar.   Rx Bactrim DS x 3 days sent to pharmacy. Call if no improvement in 24-48 hrs

## 2023-12-12 ENCOUNTER — HOSPITAL ENCOUNTER (OUTPATIENT)
Facility: CLINIC | Age: 38
Discharge: HOME | End: 2023-12-12
Attending: FAMILY MEDICINE
Payer: COMMERCIAL

## 2023-12-12 VITALS
SYSTOLIC BLOOD PRESSURE: 138 MMHG | DIASTOLIC BLOOD PRESSURE: 88 MMHG | TEMPERATURE: 98.9 F | HEART RATE: 96 BPM | OXYGEN SATURATION: 97 %

## 2023-12-12 DIAGNOSIS — R05.1 ACUTE COUGH: Primary | ICD-10-CM

## 2023-12-12 PROCEDURE — S9083 URGENT CARE CENTER GLOBAL: HCPCS | Performed by: NURSE PRACTITIONER

## 2023-12-12 PROCEDURE — 99213 OFFICE O/P EST LOW 20 MIN: CPT | Performed by: NURSE PRACTITIONER

## 2023-12-12 RX ORDER — BENZONATATE 100 MG/1
100 CAPSULE ORAL 3 TIMES DAILY PRN
Qty: 30 CAPSULE | Refills: 0 | Status: SHIPPED | OUTPATIENT
Start: 2023-12-12 | End: 2023-12-22

## 2023-12-12 ASSESSMENT — ENCOUNTER SYMPTOMS
CHILLS: 0
SHORTNESS OF BREATH: 0
TROUBLE SWALLOWING: 0
SORE THROAT: 0
ABDOMINAL PAIN: 0
CHEST TIGHTNESS: 0
FATIGUE: 0
VOMITING: 0
WHEEZING: 1
HEADACHES: 1
FEVER: 0
LIGHT-HEADEDNESS: 0
PALPITATIONS: 0
APPETITE CHANGE: 0
COUGH: 1
RHINORRHEA: 1
DIZZINESS: 0
SINUS PAIN: 0
NAUSEA: 0
DYSURIA: 0
MYALGIAS: 0
WEAKNESS: 0
SINUS PRESSURE: 0
STRIDOR: 0
ADENOPATHY: 0
DIARRHEA: 0
DIAPHORESIS: 0

## 2023-12-12 NOTE — DISCHARGE INSTRUCTIONS
This is likely a viral upper respiratory infection.   Start Mucinex OTC and follow the package instructions directly. Do not use this medicine if your family doctor or another provider has ever advised you to avoid using it.   Start Flonase nasal spray - 1-2 sprays in each nostril daily. Do not use if you have ever been advised against this. Do not use if you are prone to nosebleeds or dry nostrils/airways.   Drink plenty of fluids and rest as much as possible.   Monitor for fever every 8 hours. A fever is a temp > 100.4 F.   Treat fevers, body aches, and chills with Tylenol 650-1000 mg every 8 hours as needed. Do not use if you have ever been advised not to, or if you have any history of liver disease.     RX/PICK-UP:  Tessalon Perles were prescribed for cough. Can take 1 cap as needed every 8 hours for cough.

## 2023-12-12 NOTE — LETTER
December 12, 2023    Patient: Daya Pacheco   YOB: 1985   Date of Visit: 12/12/2023       To Whom It May Concern:    Daya Pacheco was seen and treated in our urgent care department on 12/12/2023. Please excuse her for any recent absences associated with this visit.    If you have any questions or concerns, please don't hesitate to call.    Maria T Vuong DNP, CRNP, FNP-BC  [facility]  Urgent Care, Occupational Health, and Travel Medicine

## 2023-12-12 NOTE — ED PROVIDER NOTES
Emergency Medicine Note  HPI   HISTORY OF PRESENT ILLNESS     Daya is a 39 yo female patient presenting today for URI symptoms.     10 days of symptoms:  • PND, dry cough.   • Feels like she is wheezing, jovanni with coughing fits.   • Daughter is sick with a virus.     Pertinent negatives:  • No fever, chills, body aches.  • No nasal congestion, stuffy nose, sinus pain, or sinus pressure.  • Denies n/v/d/c. Appetite normal and baseline. No abdominal pain at this time.   • No SOB, FAJARDO, CP, palpitations.  • No sore throat.  • No choking/gagging/difficulty swallowing.  • No hearing loss or tinnitus.   • No ear pain, pressure.   • No new joint aches or night sweats.  • No rashes or skin changes noted.          Patient History   PAST HISTORY     Reviewed from Nursing Triage:       Past Medical History:   Diagnosis Date   • Anemia in pregnancy        Past Surgical History:   Procedure Laterality Date   •  SECTION, LOW TRANSVERSE     •  SECTION, LOW TRANSVERSE     • D&C FIRST TRIMESTER / TX INCOMPLETE / MISSED / SEPTIC / INDUCED      • D&C FIRST TRIMESTER / TX INCOMPLETE / MISSED / SEPTIC / INDUCED      • D&C FIRST TRIMESTER / TX INCOMPLETE / MISSED / SEPTIC / INDUCED   2017   • TONSILECTOMY, ADENOIDECTOMY, BILATERAL MYRINGOTOMY AND TUBES      no problems   • TONSILLECTOMY     • WISDOM TOOTH EXTRACTION         Family History   Problem Relation Age of Onset   • No Known Problems Biological Mother    • No Known Problems Biological Father        Social History     Tobacco Use   • Smoking status: Never   • Smokeless tobacco: Never   Substance Use Topics   • Alcohol use: No   • Drug use: No         Review of Systems   REVIEW OF SYSTEMS     Review of Systems   Constitutional: Negative for appetite change, chills, diaphoresis, fatigue and fever.   HENT: Positive for postnasal drip and rhinorrhea. Negative for congestion, ear pain, hearing loss, sinus pressure, sinus pain, sore  throat and trouble swallowing.    Respiratory: Positive for cough (has mostly been dry) and wheezing (wheezing all of the time per report). Negative for chest tightness, shortness of breath and stridor.    Cardiovascular: Negative for chest pain and palpitations.   Gastrointestinal: Negative for abdominal pain, diarrhea, nausea and vomiting.   Genitourinary: Negative for dysuria.   Musculoskeletal: Negative for myalgias.   Skin: Negative for rash.   Neurological: Positive for headaches (intermittent). Negative for dizziness, weakness and light-headedness.   Hematological: Negative for adenopathy.         VITALS     ED Vitals    Date/Time Temp Pulse Resp BP SpO2 [facility]   12/12/23 1408 37.2 °C (98.9 °F) 96 -- 138/88 97 % CNP           Physical Exam   PHYSICAL EXAM     Physical Exam  Vitals and nursing note reviewed.   Constitutional:       General: She is not in acute distress.     Appearance: She is not ill-appearing or toxic-appearing.   HENT:      Head: Normocephalic and atraumatic.      Right Ear: Hearing, tympanic membrane, ear canal and external ear normal.      Left Ear: Hearing, tympanic membrane, ear canal and external ear normal.      Nose: No mucosal edema or rhinorrhea.      Right Sinus: No maxillary sinus tenderness or frontal sinus tenderness.      Left Sinus: No maxillary sinus tenderness or frontal sinus tenderness.      Mouth/Throat:      Pharynx: Uvula midline. No oropharyngeal exudate or posterior oropharyngeal erythema.   Eyes:      General: Lids are normal. Lids are everted, no foreign bodies appreciated.      Conjunctiva/sclera: Conjunctivae normal.   Neck:      Trachea: Trachea and phonation normal.   Cardiovascular:      Rate and Rhythm: Normal rate and regular rhythm.      Heart sounds: Normal heart sounds.   Pulmonary:      Effort: Pulmonary effort is normal. No tachypnea, accessory muscle usage or respiratory distress.      Breath sounds: Normal breath sounds. No wheezing.   Musculoskeletal:       Cervical back: Full passive range of motion without pain, normal range of motion and neck supple.   Lymphadenopathy:      Head:      Right side of head: No submental or submandibular adenopathy.      Left side of head: No submental or submandibular adenopathy.      Cervical: No cervical adenopathy.   Skin:     General: Skin is warm and dry.   Neurological:      Mental Status: She is alert and oriented to person, place, and time.   Psychiatric:         Behavior: Behavior normal.         Thought Content: Thought content normal.         Judgment: Judgment normal.           PROCEDURES     Procedures     DATA     Results     None              No orders to display       Scoring tools                                  ED Course & MDM   MDM / ED COURSE / CLINICAL IMPRESSION / DISPO     • See ROS/PE.   • Suspect viral illness with persistent cough.  • Discussed and offered CXR, but patient declined.    • Discussed antibiotic use at this time, and patient declines. She prefers to treat conservatively as recommended, monitor, and if not improving, will return for reconsideration of antibiotic therapy and/or CXR.   • Tessalon Perles were prescribed for cough. Can take 1-2 caps as needed every 8 hours for cough.    • Recommended and reinforced conservative therapies (I.e. OTC fever/cough/pain management, temperature monitoring, fluids/PO intake, etc).    • Side effect profiles of all treatments and medications reviewed.   • Reviewed red-flag symptoms and indications to present to the ER (for worsening) or PCP (for lack of improvement, worsening, and general follow-up) for further evaluation.     • Patient displays the capacity to accept or deny medical recommendations. Patient verbalized an understanding of my recommendations as stated above.          Clinical Impression      None               Maria T Vuong, EDNA  12/12/23 8635

## 2023-12-13 NOTE — ED ATTESTATION NOTE
I was immediately available to provide supervision and direction for the care of the patient.    The patient was evaluated and managed by the nurse practitioner.       Walter Santana,   12/13/23 1500

## 2024-02-08 RX ORDER — NORETHINDRONE ACETATE AND ETHINYL ESTRADIOL AND FERROUS FUMARATE 1.5-30(21)
1 KIT ORAL DAILY
Qty: 84 TABLET | Refills: 3 | OUTPATIENT
Start: 2024-02-08

## 2024-02-08 NOTE — TELEPHONE ENCOUNTER
"Medicine Refill Request    Last Office Visit: 12/5/2022   Last Consult Visit: 5/11/2021  Last Telemedicine Visit: Visit date not found    Next Appointment: Visit date not found      Current Outpatient Medications:   •  dextroamphetamine-amphetamine 15 mg tablet, , Disp: , Rfl:   •  fluticasone propionate 50 mcg/actuation nasal spray, Administer 2 sprays into each nostril daily. (Patient not taking: Reported on 12/5/2022), Disp: 16 g, Rfl: 0  •  norethindrone-e.estradioL-iron (JUNEL FE 1.5/30, 28,) 1.5 mg-30 mcg (21)/75 mg (7) per tablet, Take 1 tablet by mouth daily., Disp: 84 tablet, Rfl: 3  •  omeprazole 20 mg tablet, Take 20 mg by mouth daily before breakfast., Disp: , Rfl:       BP Readings from Last 3 Encounters:   12/12/23 138/88   12/05/22 118/72   11/05/21 120/72       Recent Lab results:  No results found for: \"CHOL\", No results found for: \"HDL\", No results found for: \"LDLCALC\", No results found for: \"TRIG\"     Lab Results   Component Value Date    GLUCOSE 123 (H) 01/21/2018   ,   Lab Results   Component Value Date    HGBA1C 5.0 02/12/2018         Lab Results   Component Value Date    CREATININE 0.5 (L) 01/21/2018       Lab Results   Component Value Date    TSH 1.80 09/27/2018           Lab Results   Component Value Date    HGBA1C 5.0 02/12/2018       "

## 2024-12-02 ENCOUNTER — TELEPHONE (OUTPATIENT)
Dept: OBSTETRICS AND GYNECOLOGY | Facility: CLINIC | Age: 39
End: 2024-12-02
Payer: COMMERCIAL

## 2024-12-02 NOTE — TELEPHONE ENCOUNTER
Medication Request   Patient PCP: Unable, To Obtain Pcp  Next Office Visit: 3/5/2025  Has this provider prescribed this medication before?: Yes   Medication Name: norethindrone-e.estradioL-iron (JUNEL FE 1.5/30, 28,)   Medication Dose: 1.5 mg-30 mcg (21)/75 mg (7) per tablet   Medication Frequency: Take 1 tablet by mouth daily   Preferred Pharmacy:   [facility]/pharmacy #[id number] - [place] - [address] [facility]  [address] [address]  Phone: [phone] Fax: [phone]      Please allow 2 business days for your provider to send your medication request or to reach out to discuss.

## 2024-12-03 RX ORDER — NORETHINDRONE ACETATE AND ETHINYL ESTRADIOL 1.5-30(21)
1 KIT ORAL DAILY
Qty: 84 TABLET | Refills: 3 | OUTPATIENT
Start: 2024-12-03

## 2024-12-03 RX ORDER — NORETHINDRONE ACETATE AND ETHINYL ESTRADIOL 1.5-30(21)
1 KIT ORAL DAILY
Qty: 84 TABLET | Refills: 3 | Status: CANCELLED | OUTPATIENT
Start: 2024-12-03

## 2024-12-03 NOTE — TELEPHONE ENCOUNTER
"Medicine Refill Request    Last Office Visit: 12/5/2022   Last Consult Visit: 5/11/2021  Last Telemedicine Visit: Visit date not found    Next Appointment: 3/5/2025      Current Outpatient Medications:     dextroamphetamine-amphetamine 15 mg tablet, , Disp: , Rfl:     fluticasone propionate 50 mcg/actuation nasal spray, Administer 2 sprays into each nostril daily. (Patient not taking: Reported on 12/5/2022), Disp: 16 g, Rfl: 0    norethindrone-e.estradioL-iron (JUNEL FE 1.5/30, 28,) 1.5 mg-30 mcg (21)/75 mg (7) per tablet, Take 1 tablet by mouth daily., Disp: 84 tablet, Rfl: 3    omeprazole 20 mg tablet, Take 20 mg by mouth daily before breakfast., Disp: , Rfl:       BP Readings from Last 3 Encounters:   12/12/23 138/88   12/05/22 118/72   11/05/21 120/72       Recent Lab results:  No results found for: \"CHOL\", No results found for: \"HDL\", No results found for: \"LDLCALC\", No results found for: \"TRIG\"     Lab Results   Component Value Date    GLUCOSE 123 (H) 01/21/2018   ,   Lab Results   Component Value Date    HGBA1C 5.0 02/12/2018         Lab Results   Component Value Date    CREATININE 0.5 (L) 01/21/2018       Lab Results   Component Value Date    TSH 1.80 09/27/2018           Lab Results   Component Value Date    HGBA1C 5.0 02/12/2018       "

## 2024-12-03 NOTE — TELEPHONE ENCOUNTER
Spoke to pt     Pt agreed to move annual up to 12/10 @ 8073   Pt understands slynd will be written for at appt

## 2025-03-10 ENCOUNTER — OFFICE VISIT (OUTPATIENT)
Dept: OBSTETRICS AND GYNECOLOGY | Facility: CLINIC | Age: 40
End: 2025-03-10
Payer: COMMERCIAL

## 2025-03-10 VITALS
DIASTOLIC BLOOD PRESSURE: 98 MMHG | WEIGHT: 225 LBS | BODY MASS INDEX: 31.5 KG/M2 | SYSTOLIC BLOOD PRESSURE: 144 MMHG | HEIGHT: 71 IN

## 2025-03-10 DIAGNOSIS — Z01.419 WELL WOMAN EXAM WITH ROUTINE GYNECOLOGICAL EXAM: Primary | ICD-10-CM

## 2025-03-10 DIAGNOSIS — Z12.4 PAP SMEAR FOR CERVICAL CANCER SCREENING: ICD-10-CM

## 2025-03-10 DIAGNOSIS — R03.0 ELEVATED BLOOD PRESSURE READING: ICD-10-CM

## 2025-03-10 DIAGNOSIS — Z12.31 ENCOUNTER FOR SCREENING MAMMOGRAM FOR MALIGNANT NEOPLASM OF BREAST: ICD-10-CM

## 2025-03-10 DIAGNOSIS — Z11.51 ENCOUNTER FOR SCREENING FOR HUMAN PAPILLOMAVIRUS (HPV): ICD-10-CM

## 2025-03-10 DIAGNOSIS — D36.9 DERMOID CYST: ICD-10-CM

## 2025-03-10 DIAGNOSIS — Z72.51 HIGH RISK HETEROSEXUAL BEHAVIOR: ICD-10-CM

## 2025-03-10 PROCEDURE — 3008F BODY MASS INDEX DOCD: CPT

## 2025-03-10 PROCEDURE — 99395 PREV VISIT EST AGE 18-39: CPT

## 2025-03-10 RX ORDER — NORETHINDRONE 0.35 MG/1
1 TABLET ORAL DAILY
Qty: 84 TABLET | Refills: 3 | Status: SHIPPED | OUTPATIENT
Start: 2025-03-10 | End: 2026-03-10

## 2025-03-10 ASSESSMENT — ENCOUNTER SYMPTOMS
CONSTITUTIONAL NEGATIVE: 1
GASTROINTESTINAL NEGATIVE: 1

## 2025-03-10 NOTE — PROGRESS NOTES
"Subjective      Patient ID: Daya Pacheco is a 39 y.o.     Vitals:    03/10/25 1444   BP: (!) 144/98   Weight: 102 kg (225 lb)   Height: 1.803 m (5' 11\")     Body mass index is 31.38 kg/m².  Patient's last menstrual period was 2025 (exact date).    Annual.  Last  pap:  normal   Hx of pap: no abnormal   Mammogram: never   Periods : q 28 days , last 3-4 days   SA: yes  Gr[place]il: no     Sister is being treated for KAMAR III, she is 42 yo.    BP elevated today , states anxious for this visit, and situational with her sister health scare.    Therapist at [employer].     Kids 10,6    The following have been reviewed and updated as appropriate in this visit:    Current Outpatient Medications:     dextroamphetamine-amphetamine 15 mg tablet, , Disp: , Rfl:     fluticasone propionate 50 mcg/actuation nasal spray, Administer 2 sprays into each nostril daily. (Patient not taking: Reported on 3/10/2025), Disp: 16 g, Rfl: 0    norethindrone-e.estradioL-iron (JUNEL FE 1.,) 1.5 mg-30 mcg (21)/75 mg (7) per tablet, Take 1 tablet by mouth daily., Disp: 84 tablet, Rfl: 3    omeprazole 20 mg tablet, Take 20 mg by mouth daily before breakfast., Disp: , Rfl:   Past Medical History:   Diagnosis Date    Anemia in pregnancy      Past Surgical History   Procedure Laterality Date     section, low transverse       section, low transverse      D&c first trimester / tx incomplete / missed / septic / induced       D&c first trimester / tx incomplete / missed / septic / induced       D&c first trimester / tx incomplete / missed / septic / induced       D&C, HYSTEROSCOPY N/A 2018    Performed by Tera Fong MD at [facility]    Repeat c section N/A 8/15/2018    Performed by Cammie Gutiérrez MD at [facility]    Tonsilectomy, adenoidectomy, bilateral myringotomy and tubes      no problems    Tonsillectomy      [place] tooth " extraction       OB History    Para Term  AB Living   6 2 2 0 4 2   SAB IAB Ectopic Multiple Live Births   4 0 0 0 2      # Outcome Date GA Lbr Miky/2nd Weight Sex Type Anes PTL Lv   6 Term 08/15/18 39w0d  4.67 kg (10 lb 4.7 oz) F CS-LTranv Spinal N IVON      Name: AIME CODY      Apgar1: 7  Apgar5: 9   5 2017     SAB         Birth Comments: chemical pregnancy   4 2017     SAB         Birth Comments: D&E for incomplete AB   3 Term 11/15/15 39w0d  5.33 kg (11 lb 12 oz) F CS-LTranv Spinal  IVON      Birth Comments: Postpartum hemorrhage, then retained placenta requiring D&C 2 wks post-op   2 2014 7w0d    SAB         Birth Comments: D&E for missed AB   1 2011     SAB         Birth Comments: D&E for blighted ovum     Family History   Problem Relation Name Age of Onset    No Known Problems Biological Mother      No Known Problems Biological Father       Review of Systems   Constitutional: Negative.    Gastrointestinal: Negative.    Genitourinary: Negative.    All other systems reviewed and are negative.      Objective     Physical Exam  Constitutional:       Appearance: Normal appearance. She is obese.     Genitourinary:    Vulva, urethra, bladder, vagina, cervix, uterus, urethral meatus, external female genitalia and adnexa normal.     Chest:   Breasts:     Right: Normal.      Left: Normal.   Abdominal:      Palpations: Abdomen is soft.   Neurological:      Mental Status: She is alert and oriented to person, place, and time.   Psychiatric:         Behavior: Behavior normal.   Vitals and nursing note reviewed.         Assessment/Plan   Gyn exam normal   Pap/ HPV   POP started today   Mammogram rx to get done after 40th Bday   Pelvic US to assess dermoid cyst   BP elevated today, advised to follow up with PCP. Take BP at home when is calm state.   RTO in 1 year for AV

## 2025-03-13 LAB
CLINICAL INFO: NORMAL
CYTO CVX: NORMAL
CYTOLOGIST CVX/VAG CYTO: NORMAL
CYTOLOGY CMNT CVX/VAG CYTO-IMP: NORMAL
DATE OF PREVIOUS PAP: NORMAL
DATE PREVIOUS BX: NORMAL
HPV E6+E7 MRNA CVX QL NAA+PROBE: NOT DETECTED
LMP START DATE: NORMAL
QUEST COMMENT (PAP): NORMAL
SPECIMEN SOURCE CVX/VAG CYTO: NORMAL
STAT OF ADQ CVX/VAG CYTO-IMP: NORMAL

## (undated) DEVICE — TUBING CYSTO BLADDER IRRIG

## (undated) DEVICE — SPONGE LAP DISPOSABLE 18X18

## (undated) DEVICE — PENCIL ESU HANDSWITCHING W/HOL

## (undated) DEVICE — BAG PRESSURE INFUSE 1000CC

## (undated) DEVICE — DRESSING COMBINE 5X9 STERILE

## (undated) DEVICE — SUTURE MONOCRYL 4-0 Y426H PS-2 27IN

## (undated) DEVICE — ***USE 56952*** SUTURE VICRYL 1 J371H CTX 36IN VIOLET

## (undated) DEVICE — SUTURE MONOCRYL 4-0 Y494G

## (undated) DEVICE — STIRRUP STRAP DISPOSABLE

## (undated) DEVICE — PAD GROUND ELECTROSURGICAL W/CORD

## (undated) DEVICE — MANIFOLD FOUR PORT NEPTUNE

## (undated) DEVICE — PAD PERI MATERNITY LENGTH

## (undated) DEVICE — SPONGE CURITY GAUZE 4

## (undated) DEVICE — SUTURE VICRYL 3-0 RAPIDE 36"

## (undated) DEVICE — PARTICLES HEMOSTATIC ARISTA 3 GRAM

## (undated) DEVICE — SOLN IV 0.9% NSS 1000ML

## (undated) DEVICE — DRESSING ABD STER LGE 8X10

## (undated) DEVICE — APPLICATOR CHLORAPREP 26ML ORANGE TINT

## (undated) DEVICE — SOLN IRRIG .9%SOD 1000ML

## (undated) DEVICE — GLOVE SZ 8 LINER PROTEXIS PI BL

## (undated) DEVICE — ***USE 138714*** SUTURE VICRYL 1 J341H CT-1

## (undated) DEVICE — Device

## (undated) DEVICE — DRAPE POUCH IRRIGATION

## (undated) DEVICE — ***USE 121401***PACK DELIVERY C-SECTION CUSTOM

## (undated) DEVICE — GLOVE SURG PROTEXIS PF 8

## (undated) DEVICE — ***USE 138703*** SUTURE CHROMIC GUT  0   802H

## (undated) DEVICE — CONTAINER SPECIMEN STERILE 5OZ

## (undated) DEVICE — BLADE SCALPEL #10

## (undated) DEVICE — ***USE 56895*** SUTURE CHROMIC GUT 3-0 636H

## (undated) DEVICE — DRESSING TELFA 3X8

## (undated) DEVICE — MANIFOLD SINGLE PORT NEPTUNE